# Patient Record
Sex: MALE | URBAN - METROPOLITAN AREA
[De-identification: names, ages, dates, MRNs, and addresses within clinical notes are randomized per-mention and may not be internally consistent; named-entity substitution may affect disease eponyms.]

---

## 2023-10-26 ENCOUNTER — NURSING HOME VISIT (OUTPATIENT)
Dept: POST ACUTE CARE | Facility: EXTERNAL LOCATION | Age: 88
End: 2023-10-26
Payer: MEDICARE

## 2023-10-26 DIAGNOSIS — E78.49 OTHER HYPERLIPIDEMIA: ICD-10-CM

## 2023-10-26 DIAGNOSIS — H91.93 BILATERAL DEAFNESS: ICD-10-CM

## 2023-10-26 DIAGNOSIS — F02.C0 SEVERE LATE ONSET ALZHEIMER'S DEMENTIA WITHOUT BEHAVIORAL DISTURBANCE, PSYCHOTIC DISTURBANCE, MOOD DISTURBANCE, OR ANXIETY (MULTI): ICD-10-CM

## 2023-10-26 DIAGNOSIS — R54 FRAILTY SYNDROME IN GERIATRIC PATIENT: Primary | ICD-10-CM

## 2023-10-26 DIAGNOSIS — H54.8 LEGAL BLINDNESS: ICD-10-CM

## 2023-10-26 DIAGNOSIS — G30.1 SEVERE LATE ONSET ALZHEIMER'S DEMENTIA WITHOUT BEHAVIORAL DISTURBANCE, PSYCHOTIC DISTURBANCE, MOOD DISTURBANCE, OR ANXIETY (MULTI): ICD-10-CM

## 2023-10-26 DIAGNOSIS — Z51.5 HOSPICE CARE PATIENT: ICD-10-CM

## 2023-10-26 PROCEDURE — 99306 1ST NF CARE HIGH MDM 50: CPT | Performed by: INTERNAL MEDICINE

## 2023-10-28 VITALS
TEMPERATURE: 97.9 F | OXYGEN SATURATION: 95 % | DIASTOLIC BLOOD PRESSURE: 72 MMHG | RESPIRATION RATE: 16 BRPM | HEART RATE: 88 BPM | SYSTOLIC BLOOD PRESSURE: 110 MMHG

## 2023-10-28 NOTE — PROGRESS NOTES
Subjective   Patient ID: Denny Aguayo is a 89 y.o. male who is long term resident being seen and evaluated for multiple medical problems.    HPI   This 89-year-old male patient is resting comfortably in his bed in no distress.  He has a history of blindness, deafness, Alzheimer's dementia, and history of distant alcohol use.  He has a patient on [facility].  He was previously residing at [facility].  He is admitted here to the extended care facility on hospice due to his declining state at the assisted living facility.  He is a [employer] .    Current medications:  Tylenol  DuoNeb  Levsin  Ativan  Morphine  Compazine    There are no current medications for review either on the chart or in epic    Review of Systems   Reason unable to perform ROS: Unable to communicate due to deafness and blindness.       Objective   /72   Pulse 88   Temp 36.6 °C (97.9 °F)   Resp 16   SpO2 95%     Physical Exam  Vitals reviewed.   Constitutional:       General: He is not in acute distress.     Appearance: He is not ill-appearing.      Comments: Frail elderly resting comfortably in bed in no distress   Cardiovascular:      Rate and Rhythm: Normal rate and regular rhythm.      Pulses: Normal pulses.      Heart sounds:      No gallop.   Pulmonary:      Breath sounds: Normal breath sounds. No wheezing, rhonchi or rales.   Abdominal:      General: Abdomen is flat. Bowel sounds are normal.      Palpations: Abdomen is soft.      Tenderness: There is no guarding or rebound.   Musculoskeletal:      Right lower leg: No edema.      Left lower leg: No edema.         Assessment/Plan   Problem List Items Addressed This Visit             ICD-10-CM    Frailty syndrome in geriatric patient - Primary R54    Severe late onset Alzheimer's dementia without behavioral disturbance, psychotic disturbance, mood disturbance, or anxiety (CMS/[facility]) G30.1, F02.C0    Legal blindness H54.8    Bilateral deafness H91.93     Other hyperlipidemia E78.49    Hospice care patient Z51.5     A.  We will continue with hospice and comfort care as the patient and family requested    B.  Laboratory examinations will be deferred due to hospice care status    C.  The patient's prognosis is [id number].

## 2023-10-30 DIAGNOSIS — F41.9 ANXIETY: Primary | ICD-10-CM

## 2023-10-30 RX ORDER — LORAZEPAM 0.5 MG/1
0.5 TABLET ORAL DAILY
Qty: 30 TABLET | Refills: 5 | Status: SHIPPED | OUTPATIENT
Start: 2023-10-30 | End: 2024-03-18 | Stop reason: SDUPTHER

## 2023-10-31 ENCOUNTER — NURSING HOME VISIT (OUTPATIENT)
Dept: POST ACUTE CARE | Facility: EXTERNAL LOCATION | Age: 88
End: 2023-10-31
Payer: MEDICARE

## 2023-10-31 VITALS
WEIGHT: 129 LBS | OXYGEN SATURATION: 99 % | TEMPERATURE: 97.4 F | RESPIRATION RATE: 16 BRPM | DIASTOLIC BLOOD PRESSURE: 78 MMHG | SYSTOLIC BLOOD PRESSURE: 141 MMHG | HEART RATE: 75 BPM

## 2023-10-31 DIAGNOSIS — H91.93 BILATERAL DEAFNESS: ICD-10-CM

## 2023-10-31 DIAGNOSIS — G30.1 SEVERE LATE ONSET ALZHEIMER'S DEMENTIA WITHOUT BEHAVIORAL DISTURBANCE, PSYCHOTIC DISTURBANCE, MOOD DISTURBANCE, OR ANXIETY (MULTI): ICD-10-CM

## 2023-10-31 DIAGNOSIS — F02.C0 SEVERE LATE ONSET ALZHEIMER'S DEMENTIA WITHOUT BEHAVIORAL DISTURBANCE, PSYCHOTIC DISTURBANCE, MOOD DISTURBANCE, OR ANXIETY (MULTI): ICD-10-CM

## 2023-10-31 DIAGNOSIS — R54 FRAILTY SYNDROME IN GERIATRIC PATIENT: ICD-10-CM

## 2023-10-31 DIAGNOSIS — Z51.5 HOSPICE CARE PATIENT: Primary | ICD-10-CM

## 2023-10-31 DIAGNOSIS — H54.8 LEGAL BLINDNESS: ICD-10-CM

## 2023-10-31 PROBLEM — E78.49 OTHER HYPERLIPIDEMIA: Status: ACTIVE | Noted: 2023-10-31

## 2023-10-31 PROCEDURE — 99309 SBSQ NF CARE MODERATE MDM 30: CPT | Performed by: NURSE PRACTITIONER

## 2023-10-31 NOTE — LETTER
Patient: Denny Aguayo  : 1930    Encounter Date: 10/31/2023    Subjective  Patient ID: Denny Aguayo is a 89 yo male here to follow up on recent admission to the facility.     HPI  He is under hospice care and the AL was unable to continue to meet his needs.  He is deaf and blind and does not communicate, he is frequently resistant to care.  When he is sitting in chair he tends to attempt to get out placing him at an increased risk of falls.  He is eating and drinking well per staff with full assist.  No concerns per staff.       Review of Systems   Reason unable to perform ROS: Unable to communicate due to deafness and blindness.       Objective  /78   Pulse 75   Temp 36.3 °C (97.4 °F)   Resp 16   Wt 58.5 kg (129 lb)   SpO2 99%     Physical Exam  Vitals reviewed.   Constitutional:       General: He is not in acute distress.     Appearance: He is not ill-appearing.      Comments: Frail elderly resting comfortably in bed in no distress   Cardiovascular:      Rate and Rhythm: Normal rate and regular rhythm.      Pulses: Normal pulses.      Heart sounds:      No gallop.   Pulmonary:      Breath sounds: Normal breath sounds. No wheezing, rhonchi or rales.   Abdominal:      General: Abdomen is flat. Bowel sounds are normal.      Palpations: Abdomen is soft.      Tenderness: There is no guarding or rebound.   Musculoskeletal:      Right lower leg: No edema.      Left lower leg: No edema.         Assessment/Plan  Problem List Items Addressed This Visit             ICD-10-CM    Frailty syndrome in geriatric patient R54     At times becomes agitated with care.          Severe late onset Alzheimer's dementia without behavioral disturbance, psychotic disturbance, mood disturbance, or anxiety (CMS/[facility]) G30.1, F02.C0     Under hospice care.           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          Bilateral deafness H91.93     He requires assistance with all adl's.          Hospice care  patient - Primary Z51.5     He is under hospice care.  Symptoms are well controlled.         meds/orders reviewed  staff to monitor and notify for any changes  Continue with hospice care  No further labs in keeping with hospice philosophy      Electronically Signed By: SIDRA Randhawa   10/31/23 12:18 PM

## 2023-10-31 NOTE — PROGRESS NOTES
Subjective   Patient ID: Denny Aguayo is a 89 yo male here to follow up on recent admission to the facility.     HPI  He is under hospice care and the AL was unable to continue to meet his needs.  He is deaf and blind and does not communicate, he is frequently resistant to care.  When he is sitting in chair he tends to attempt to get out placing him at an increased risk of falls.  He is eating and drinking well per staff with full assist.  No concerns per staff.       Review of Systems   Reason unable to perform ROS: Unable to communicate due to deafness and blindness.       Objective   /78   Pulse 75   Temp 36.3 °C (97.4 °F)   Resp 16   Wt 58.5 kg (129 lb)   SpO2 99%     Physical Exam  Vitals reviewed.   Constitutional:       General: He is not in acute distress.     Appearance: He is not ill-appearing.      Comments: Frail elderly resting comfortably in bed in no distress   Cardiovascular:      Rate and Rhythm: Normal rate and regular rhythm.      Pulses: Normal pulses.      Heart sounds:      No gallop.   Pulmonary:      Breath sounds: Normal breath sounds. No wheezing, rhonchi or rales.   Abdominal:      General: Abdomen is flat. Bowel sounds are normal.      Palpations: Abdomen is soft.      Tenderness: There is no guarding or rebound.   Musculoskeletal:      Right lower leg: No edema.      Left lower leg: No edema.         Assessment/Plan   Problem List Items Addressed This Visit             ICD-10-CM    Frailty syndrome in geriatric patient R54     At times becomes agitated with care.          Severe late onset Alzheimer's dementia without behavioral disturbance, psychotic disturbance, mood disturbance, or anxiety (CMS/[facility]) G30.1, F02.C0     Under hospice care.           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          Bilateral deafness H91.93     He requires assistance with all adl's.          Hospice care patient - Primary Z51.5     He is under hospice care.  Symptoms are  well controlled.         meds/orders reviewed  staff to monitor and notify for any changes  Continue with hospice care  No further labs in keeping with hospice philosophy

## 2023-11-17 ENCOUNTER — NURSING HOME VISIT (OUTPATIENT)
Dept: POST ACUTE CARE | Facility: EXTERNAL LOCATION | Age: 88
End: 2023-11-17
Payer: MEDICARE

## 2023-11-17 VITALS
HEART RATE: 72 BPM | SYSTOLIC BLOOD PRESSURE: 132 MMHG | WEIGHT: 122 LBS | DIASTOLIC BLOOD PRESSURE: 78 MMHG | RESPIRATION RATE: 16 BRPM | TEMPERATURE: 98 F | OXYGEN SATURATION: 97 %

## 2023-11-17 DIAGNOSIS — G30.1 SEVERE LATE ONSET ALZHEIMER'S DEMENTIA WITHOUT BEHAVIORAL DISTURBANCE, PSYCHOTIC DISTURBANCE, MOOD DISTURBANCE, OR ANXIETY (MULTI): ICD-10-CM

## 2023-11-17 DIAGNOSIS — F02.C0 SEVERE LATE ONSET ALZHEIMER'S DEMENTIA WITHOUT BEHAVIORAL DISTURBANCE, PSYCHOTIC DISTURBANCE, MOOD DISTURBANCE, OR ANXIETY (MULTI): ICD-10-CM

## 2023-11-17 DIAGNOSIS — H91.93 BILATERAL DEAFNESS: ICD-10-CM

## 2023-11-17 DIAGNOSIS — R54 FRAILTY SYNDROME IN GERIATRIC PATIENT: Primary | ICD-10-CM

## 2023-11-17 DIAGNOSIS — Z51.5 HOSPICE CARE PATIENT: ICD-10-CM

## 2023-11-17 DIAGNOSIS — H54.8 LEGAL BLINDNESS: ICD-10-CM

## 2023-11-17 PROCEDURE — 99308 SBSQ NF CARE LOW MDM 20: CPT | Performed by: INTERNAL MEDICINE

## 2023-11-17 NOTE — PROGRESS NOTES
Subjective   Patient ID: Denny Aguayo is a 89 y.o. male who is long term resident being seen and evaluated for multiple medical problems.    HPI   This 89-year-old male patient is resting comfortably in his chair in no distress.  He remains blind and deaf and quite debilitated resting in his Elo chair.  Nursing has no new adverse events reported to me.    Current high risk medication:  Levsin  Lorazepam  Morphine    Laboratory examinations have been deferred due to hospice care status    Review of Systems   Reason unable to perform ROS: Unable to communicate due to deafness and blindness.       Objective   /78   Pulse 72   Temp 36.7 °C (98 °F)   Resp 16   Wt 55.3 kg (122 lb)   SpO2 97%     Physical Exam  Vitals reviewed.   Constitutional:       General: He is not in acute distress.     Appearance: He is not ill-appearing.      Comments: Frail elderly resting comfortably in bed in no distress   Cardiovascular:      Rate and Rhythm: Normal rate and regular rhythm.      Pulses: Normal pulses.      Heart sounds:      No gallop.   Pulmonary:      Breath sounds: Normal breath sounds. No wheezing, rhonchi or rales.   Abdominal:      General: Abdomen is flat. Bowel sounds are normal.      Palpations: Abdomen is soft.      Tenderness: There is no guarding or rebound.   Musculoskeletal:      Right lower leg: No edema.      Left lower leg: No edema.         Assessment/Plan   Problem List Items Addressed This Visit             ICD-10-CM    Frailty syndrome in geriatric patient - Primary R54    Severe late onset Alzheimer's dementia without behavioral disturbance, psychotic disturbance, mood disturbance, or anxiety (CMS/[facility]) G30.1, F02.C0    Legal blindness H54.8    Bilateral deafness H91.93    Hospice care patient Z51.5       A.  We will continue with hospice and comfort care as the patient and family requested    B.  Laboratory examinations will be deferred due to hospice care status    C.  The patient's prognosis  is [id number].

## 2023-11-17 NOTE — LETTER
Patient: Denny Aguayo  : 1930    Encounter Date: 2023    Subjective  Patient ID: Denny Aguayo is a 89 y.o. male who is long term resident being seen and evaluated for multiple medical problems.    HPI   This 89-year-old male patient is resting comfortably in his chair in no distress.  He remains blind and deaf and quite debilitated resting in his Elo chair.  Nursing has no new adverse events reported to me.    Current high risk medication:  Levsin  Lorazepam  Morphine    Laboratory examinations have been deferred due to hospice care status    Review of Systems   Reason unable to perform ROS: Unable to communicate due to deafness and blindness.       Objective  /78   Pulse 72   Temp 36.7 °C (98 °F)   Resp 16   Wt 55.3 kg (122 lb)   SpO2 97%     Physical Exam  Vitals reviewed.   Constitutional:       General: He is not in acute distress.     Appearance: He is not ill-appearing.      Comments: Frail elderly resting comfortably in bed in no distress   Cardiovascular:      Rate and Rhythm: Normal rate and regular rhythm.      Pulses: Normal pulses.      Heart sounds:      No gallop.   Pulmonary:      Breath sounds: Normal breath sounds. No wheezing, rhonchi or rales.   Abdominal:      General: Abdomen is flat. Bowel sounds are normal.      Palpations: Abdomen is soft.      Tenderness: There is no guarding or rebound.   Musculoskeletal:      Right lower leg: No edema.      Left lower leg: No edema.         Assessment/Plan  Problem List Items Addressed This Visit             ICD-10-CM    Frailty syndrome in geriatric patient - Primary R54    Severe late onset Alzheimer's dementia without behavioral disturbance, psychotic disturbance, mood disturbance, or anxiety (CMS/[facility]) G30.1, F02.C0    Legal blindness H54.8    Bilateral deafness H91.93    Hospice care patient Z51.5       A.  We will continue with hospice and comfort care as the patient and family requested    B.  Laboratory examinations will  be deferred due to hospice care status    C.  The patient's prognosis is [id number].      Electronically Signed By: Bryant Peters MD   11/29/23  5:25 PM

## 2023-11-20 ENCOUNTER — TELEPHONE (OUTPATIENT)
Dept: PRIMARY CARE | Facility: CLINIC | Age: 88
End: 2023-11-20
Payer: MEDICARE

## 2023-11-20 NOTE — TELEPHONE ENCOUNTER
Mayela at [facility] advised that the family wants to switch patient from [facility] to them.  Asking for a verbal order from you.

## 2023-12-05 ENCOUNTER — NURSING HOME VISIT (OUTPATIENT)
Dept: POST ACUTE CARE | Facility: EXTERNAL LOCATION | Age: 88
End: 2023-12-05
Payer: MEDICARE

## 2023-12-05 VITALS
OXYGEN SATURATION: 97 % | RESPIRATION RATE: 16 BRPM | HEART RATE: 72 BPM | TEMPERATURE: 97.6 F | SYSTOLIC BLOOD PRESSURE: 132 MMHG | WEIGHT: 122 LBS | DIASTOLIC BLOOD PRESSURE: 78 MMHG

## 2023-12-05 DIAGNOSIS — G30.1 SEVERE LATE ONSET ALZHEIMER'S DEMENTIA WITHOUT BEHAVIORAL DISTURBANCE, PSYCHOTIC DISTURBANCE, MOOD DISTURBANCE, OR ANXIETY (MULTI): ICD-10-CM

## 2023-12-05 DIAGNOSIS — H54.8 LEGAL BLINDNESS: ICD-10-CM

## 2023-12-05 DIAGNOSIS — H91.93 BILATERAL DEAFNESS: ICD-10-CM

## 2023-12-05 DIAGNOSIS — F02.C0 SEVERE LATE ONSET ALZHEIMER'S DEMENTIA WITHOUT BEHAVIORAL DISTURBANCE, PSYCHOTIC DISTURBANCE, MOOD DISTURBANCE, OR ANXIETY (MULTI): ICD-10-CM
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
## 2023-12-05 DIAGNOSIS — Z51.5 HOSPICE CARE PATIENT: Primary | ICD-10-CM
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
## 2023-12-05 PROCEDURE — 99309 SBSQ NF CARE MODERATE MDM 30: CPT | Performed by: NURSE PRACTITIONER

## 2023-12-05 NOTE — ASSESSMENT & PLAN NOTE
He slid out of broda chair and no apparent injury.  He is legally blind and deaf coupled with cognitive deficits which increases his risk for falls.

## 2023-12-05 NOTE — LETTER
Patient: Denny Aguayo  : 1930    Encounter Date: 2023    Subjective  Patient ID: Denny Aguayo is a 89 yo male here to follow up on fall   HPI  He slid out of the bottom of the broda chair, no apparent injury.   He is legally  deaf and blind and does not communicate, he is frequently resistant to care.  When he is sitting in chair he tends to attempt to get out placing him at an increased risk of falls.  He is eating and drinking well per staff with full assist.  No concerns per staff.   They are ordering a different type of maría-chair which with the goal to decrease risk of fall.       Review of Systems   Reason unable to perform ROS: Unable to communicate due to deafness and blindness.       Objective  /78   Pulse 72   Temp 36.4 °C (97.6 °F)   Resp 16   Wt 55.3 kg (122 lb)   SpO2 97%     Physical Exam  Vitals reviewed.   Constitutional:       General: He is not in acute distress.     Appearance: He is not ill-appearing.      Comments: Thin, Frail elderly sitting up in chair  in no apparent distress   Cardiovascular:      Rate and Rhythm: Normal rate and regular rhythm.      Pulses: Normal pulses.      Heart sounds:      No gallop.   Pulmonary:      Breath sounds: Normal breath sounds. No wheezing, rhonchi or rales.   Abdominal:      General: Abdomen is flat. Bowel sounds are normal.      Palpations: Abdomen is soft.      Tenderness: There is no guarding or rebound.   Musculoskeletal:      Right lower leg: No edema.      Left lower leg: No edema.         Assessment/Plan  Problem List Items Addressed This Visit             ICD-10-CM    Severe late onset Alzheimer's dementia without behavioral disturbance, psychotic disturbance, mood disturbance, or anxiety (CMS/[facility]) G30.1, F02.C0     Under hospice care.           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          Bilateral deafness H91.93     He requires assistance with all adl's.          Hospice care patient -  Primary Z51.5     He is under hospice care.  Symptoms appear well controlled.          Fall W19.XXXA     He slid out of broda chair and no apparent injury.  He is legally blind and deaf coupled with cognitive deficits which increases his risk for falls.         meds/orders reviewed  staff to monitor and notify for any changes  Continue with hospice care  No further labs in keeping with hospice philosophy      Electronically Signed By: ZAINA Randhawa-CNP   12/5/23 11:09 AM

## 2023-12-05 NOTE — PROGRESS NOTES
Subjective   Patient ID: Denny Aguayo is a 89 yo male here to follow up on fall 11/30  HPI  He slid out of the bottom of the broda chair, no apparent injury.   He is legally  deaf and blind and does not communicate, he is frequently resistant to care.  When he is sitting in chair he tends to attempt to get out placing him at an increased risk of falls.  He is eating and drinking well per staff with full assist.  No concerns per staff.   They are ordering a different type of maría-chair which with the goal to decrease risk of fall.       Review of Systems   Reason unable to perform ROS: Unable to communicate due to deafness and blindness.       Objective   /78   Pulse 72   Temp 36.4 °C (97.6 °F)   Resp 16   Wt 55.3 kg (122 lb)   SpO2 97%     Physical Exam  Vitals reviewed.   Constitutional:       General: He is not in acute distress.     Appearance: He is not ill-appearing.      Comments: Thin, Frail elderly sitting up in chair  in no apparent distress   Cardiovascular:      Rate and Rhythm: Normal rate and regular rhythm.      Pulses: Normal pulses.      Heart sounds:      No gallop.   Pulmonary:      Breath sounds: Normal breath sounds. No wheezing, rhonchi or rales.   Abdominal:      General: Abdomen is flat. Bowel sounds are normal.      Palpations: Abdomen is soft.      Tenderness: There is no guarding or rebound.   Musculoskeletal:      Right lower leg: No edema.      Left lower leg: No edema.         Assessment/Plan   Problem List Items Addressed This Visit             ICD-10-CM    Severe late onset Alzheimer's dementia without behavioral disturbance, psychotic disturbance, mood disturbance, or anxiety (CMS/[facility]) G30.1, F02.C0     Under hospice care.           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          Bilateral deafness H91.93     He requires assistance with all adl's.          Hospice care patient - Primary Z51.5     He is under hospice care.  Symptoms appear well  controlled.          Fall W19.XXXA     He slid out of broda chair and no apparent injury.  He is legally blind and deaf coupled with cognitive deficits which increases his risk for falls.         meds/orders reviewed  staff to monitor and notify for any changes  Continue with hospice care  No further labs in keeping with hospice philosophy

## 2023-12-12 ENCOUNTER — NURSING HOME VISIT (OUTPATIENT)
Dept: POST ACUTE CARE | Facility: EXTERNAL LOCATION | Age: 88
End: 2023-12-12
Payer: MEDICARE

## 2023-12-12 VITALS
WEIGHT: 122 LBS | RESPIRATION RATE: 16 BRPM | OXYGEN SATURATION: 97 % | SYSTOLIC BLOOD PRESSURE: 132 MMHG | TEMPERATURE: 97.8 F | HEART RATE: 72 BPM | DIASTOLIC BLOOD PRESSURE: 78 MMHG

## 2023-12-12 DIAGNOSIS — H54.8 LEGAL BLINDNESS: ICD-10-CM

## 2023-12-12 DIAGNOSIS — Z51.5 HOSPICE CARE PATIENT: Primary | ICD-10-CM

## 2023-12-12 DIAGNOSIS — F02.C0 SEVERE LATE ONSET ALZHEIMER'S DEMENTIA WITHOUT BEHAVIORAL DISTURBANCE, PSYCHOTIC DISTURBANCE, MOOD DISTURBANCE, OR ANXIETY (MULTI): ICD-10-CM

## 2023-12-12 DIAGNOSIS — G30.1 SEVERE LATE ONSET ALZHEIMER'S DEMENTIA WITHOUT BEHAVIORAL DISTURBANCE, PSYCHOTIC DISTURBANCE, MOOD DISTURBANCE, OR ANXIETY (MULTI): ICD-10-CM

## 2023-12-12 DIAGNOSIS — H91.93 BILATERAL DEAFNESS: ICD-10-CM

## 2023-12-12 PROCEDURE — 99309 SBSQ NF CARE MODERATE MDM 30: CPT | Performed by: NURSE PRACTITIONER

## 2023-12-12 NOTE — PROGRESS NOTES
Subjective   Patient ID: Denny Aguayo is a 89 yo male here for routine monthly visit.   HPI    He is legally  deaf and blind and does not communicate, he is frequently resistant to care.    He is eating and drinking well per staff with full assist.  No concerns per staff.   He remains under hsopice care.       Review of Systems   Reason unable to perform ROS: Unable to communicate due to deafness and blindness.       Objective   /78   Pulse 72   Temp 36.6 °C (97.8 °F)   Resp 16   Wt 55.3 kg (122 lb)   SpO2 97%     Physical Exam  Vitals reviewed.   Constitutional:       General: He is not in acute distress.     Appearance: He is not ill-appearing.      Comments: Thin, Frail elderly sitting up in chair  in no apparent distress   Cardiovascular:      Rate and Rhythm: Normal rate and regular rhythm.      Pulses: Normal pulses.      Heart sounds:      No gallop.   Pulmonary:      Breath sounds: Normal breath sounds. No wheezing, rhonchi or rales.   Abdominal:      General: Abdomen is flat. Bowel sounds are normal.      Palpations: Abdomen is soft.      Tenderness: There is no guarding or rebound.   Musculoskeletal:      Right lower leg: No edema.      Left lower leg: No edema.         Assessment/Plan   Problem List Items Addressed This Visit             ICD-10-CM    Severe late onset Alzheimer's dementia without behavioral disturbance, psychotic disturbance, mood disturbance, or anxiety (CMS/[facility]) G30.1, F02.C0     Under hospice care.           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          Bilateral deafness H91.93     He requires assistance with all adl's.          Hospice care patient - Primary Z51.5     He is under hospice care.  Symptoms appear well controlled.         meds/orders reviewed  staff to monitor and notify for any changes  Continue with hospice care  No further labs in keeping with hospice philosophy

## 2023-12-12 NOTE — LETTER
Patient: Denny Aguayo  : 1930    Encounter Date: 2023    Subjective  Patient ID: Denny Aguayo is a 89 yo male here for routine monthly visit.   HPI    He is legally  deaf and blind and does not communicate, he is frequently resistant to care.    He is eating and drinking well per staff with full assist.  No concerns per staff.   He remains under hsopice care.       Review of Systems   Reason unable to perform ROS: Unable to communicate due to deafness and blindness.       Objective  /78   Pulse 72   Temp 36.6 °C (97.8 °F)   Resp 16   Wt 55.3 kg (122 lb)   SpO2 97%     Physical Exam  Vitals reviewed.   Constitutional:       General: He is not in acute distress.     Appearance: He is not ill-appearing.      Comments: Thin, Frail elderly sitting up in chair  in no apparent distress   Cardiovascular:      Rate and Rhythm: Normal rate and regular rhythm.      Pulses: Normal pulses.      Heart sounds:      No gallop.   Pulmonary:      Breath sounds: Normal breath sounds. No wheezing, rhonchi or rales.   Abdominal:      General: Abdomen is flat. Bowel sounds are normal.      Palpations: Abdomen is soft.      Tenderness: There is no guarding or rebound.   Musculoskeletal:      Right lower leg: No edema.      Left lower leg: No edema.         Assessment/Plan  Problem List Items Addressed This Visit             ICD-10-CM    Severe late onset Alzheimer's dementia without behavioral disturbance, psychotic disturbance, mood disturbance, or anxiety (CMS/[facility]) G30.1, F02.C0     Under hospice care.           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          Bilateral deafness H91.93     He requires assistance with all adl's.          Hospice care patient - Primary Z51.5     He is under hospice care.  Symptoms appear well controlled.         meds/orders reviewed  staff to monitor and notify for any changes  Continue with hospice care  No further labs in keeping with hospice  philosophy      Electronically Signed By: ZAINA Randhawa-CNP   12/12/23 11:46 AM

## 2023-12-15 ENCOUNTER — NURSING HOME VISIT (OUTPATIENT)
Dept: POST ACUTE CARE | Facility: EXTERNAL LOCATION | Age: 88
End: 2023-12-15
Payer: MEDICARE

## 2023-12-15 VITALS
SYSTOLIC BLOOD PRESSURE: 132 MMHG | HEART RATE: 72 BPM | RESPIRATION RATE: 16 BRPM | OXYGEN SATURATION: 97 % | DIASTOLIC BLOOD PRESSURE: 78 MMHG | TEMPERATURE: 97.9 F | WEIGHT: 122 LBS

## 2023-12-15 DIAGNOSIS — F02.C0 SEVERE LATE ONSET ALZHEIMER'S DEMENTIA WITHOUT BEHAVIORAL DISTURBANCE, PSYCHOTIC DISTURBANCE, MOOD DISTURBANCE, OR ANXIETY (MULTI): ICD-10-CM

## 2023-12-15 DIAGNOSIS — Z51.5 HOSPICE CARE PATIENT: ICD-10-CM
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
## 2023-12-15 DIAGNOSIS — R54 FRAILTY SYNDROME IN GERIATRIC PATIENT: Primary | ICD-10-CM

## 2023-12-15 DIAGNOSIS — H91.93 BILATERAL DEAFNESS: ICD-10-CM

## 2023-12-15 DIAGNOSIS — H54.8 LEGAL BLINDNESS: ICD-10-CM

## 2023-12-15 DIAGNOSIS — G30.1 SEVERE LATE ONSET ALZHEIMER'S DEMENTIA WITHOUT BEHAVIORAL DISTURBANCE, PSYCHOTIC DISTURBANCE, MOOD DISTURBANCE, OR ANXIETY (MULTI): ICD-10-CM

## 2023-12-15 PROCEDURE — 99308 SBSQ NF CARE LOW MDM 20: CPT | Performed by: INTERNAL MEDICINE

## 2023-12-15 NOTE — PROGRESS NOTES
Subjective   Patient ID: Denny Aguayo is a 89 y.o. male who is long term resident being seen and evaluated for multiple medical problems.    HPI   This 89-year-old male patient is resting comfortably in his recliner chair in no distress today.  He is blind and deaf and cannot communicate effectively.  Nursing has no new adverse events reported to me.    The patient's medication list has been reviewed in detail by me.    There are no laboratory examinations on the chart due to hospice care services.    Review of Systems   Reason unable to perform ROS: Unable to communicate due to deafness and blindness.       Objective   /78   Pulse 72   Temp 36.6 °C (97.9 °F)   Resp 16   Wt 55.3 kg (122 lb)   SpO2 97%     Physical Exam  Vitals reviewed.   Constitutional:       General: He is not in acute distress.     Appearance: He is not ill-appearing.      Comments: Thin, Frail elderly sitting up in chair  in no apparent distress   Cardiovascular:      Rate and Rhythm: Normal rate and regular rhythm.      Pulses: Normal pulses.      Heart sounds:      No gallop.   Pulmonary:      Breath sounds: Normal breath sounds. No wheezing, rhonchi or rales.   Abdominal:      General: Abdomen is flat. Bowel sounds are normal.      Palpations: Abdomen is soft.      Tenderness: There is no guarding or rebound.   Musculoskeletal:      Right lower leg: No edema.      Left lower leg: No edema.         Assessment/Plan   Problem List Items Addressed This Visit             ICD-10-CM    Frailty syndrome in geriatric patient - Primary R54    Severe late onset Alzheimer's dementia without behavioral disturbance, psychotic disturbance, mood disturbance, or anxiety (CMS/[facility]) G30.1, F02.C0    Legal blindness H54.8    Bilateral deafness H91.93    Hospice care patient Z51.5    Fall W19.XXXA       8.  We will continue with hospice and comfort care as the patient and family request    B.  Laboratory examinations will continue to be deferred due to  hospice care status    C.  The patient's prognosis is [id number].

## 2023-12-15 NOTE — LETTER
Patient: Denny Aguayo  : 1930    Encounter Date: 12/15/2023    Subjective  Patient ID: Denny Aguayo is a 89 y.o. male who is long term resident being seen and evaluated for multiple medical problems.    HPI   This 89-year-old male patient is resting comfortably in his recliner chair in no distress today.  He is blind and deaf and cannot communicate effectively.  Nursing has no new adverse events reported to me.    The patient's medication list has been reviewed in detail by me.    There are no laboratory examinations on the chart due to hospice care services.    Review of Systems   Reason unable to perform ROS: Unable to communicate due to deafness and blindness.       Objective  /78   Pulse 72   Temp 36.6 °C (97.9 °F)   Resp 16   Wt 55.3 kg (122 lb)   SpO2 97%     Physical Exam  Vitals reviewed.   Constitutional:       General: He is not in acute distress.     Appearance: He is not ill-appearing.      Comments: Thin, Frail elderly sitting up in chair  in no apparent distress   Cardiovascular:      Rate and Rhythm: Normal rate and regular rhythm.      Pulses: Normal pulses.      Heart sounds:      No gallop.   Pulmonary:      Breath sounds: Normal breath sounds. No wheezing, rhonchi or rales.   Abdominal:      General: Abdomen is flat. Bowel sounds are normal.      Palpations: Abdomen is soft.      Tenderness: There is no guarding or rebound.   Musculoskeletal:      Right lower leg: No edema.      Left lower leg: No edema.         Assessment/Plan  Problem List Items Addressed This Visit             ICD-10-CM    Frailty syndrome in geriatric patient - Primary R54    Severe late onset Alzheimer's dementia without behavioral disturbance, psychotic disturbance, mood disturbance, or anxiety (CMS/[facility]) G30.1, F02.C0    Legal blindness H54.8    Bilateral deafness H91.93    Hospice care patient Z51.5    Fall W19.XXXA       8.  We will continue with hospice and comfort care as the patient and family  request    B.  Laboratory examinations will continue to be deferred due to hospice care status    C.  The patient's prognosis is [id number].      Electronically Signed By: Bryant Peters MD   12/27/23  5:47 PM

## 2024-01-18 ENCOUNTER — NURSING HOME VISIT (OUTPATIENT)
Dept: POST ACUTE CARE | Facility: EXTERNAL LOCATION | Age: 89
End: 2024-01-18
Payer: MEDICARE

## 2024-01-18 VITALS
SYSTOLIC BLOOD PRESSURE: 132 MMHG | OXYGEN SATURATION: 97 % | DIASTOLIC BLOOD PRESSURE: 78 MMHG | RESPIRATION RATE: 16 BRPM | HEART RATE: 72 BPM | TEMPERATURE: 97.8 F | WEIGHT: 119 LBS

## 2024-01-18 DIAGNOSIS — R54 FRAILTY SYNDROME IN GERIATRIC PATIENT: Primary | ICD-10-CM

## 2024-01-18 DIAGNOSIS — Z51.5 HOSPICE CARE PATIENT: ICD-10-CM

## 2024-01-18 DIAGNOSIS — G30.1 SEVERE LATE ONSET ALZHEIMER'S DEMENTIA WITHOUT BEHAVIORAL DISTURBANCE, PSYCHOTIC DISTURBANCE, MOOD DISTURBANCE, OR ANXIETY (MULTI): ICD-10-CM

## 2024-01-18 DIAGNOSIS — H91.93 BILATERAL DEAFNESS: ICD-10-CM

## 2024-01-18 DIAGNOSIS — H54.8 LEGAL BLINDNESS: ICD-10-CM

## 2024-01-18 DIAGNOSIS — F02.C0 SEVERE LATE ONSET ALZHEIMER'S DEMENTIA WITHOUT BEHAVIORAL DISTURBANCE, PSYCHOTIC DISTURBANCE, MOOD DISTURBANCE, OR ANXIETY (MULTI): ICD-10-CM

## 2024-01-18 PROCEDURE — 99308 SBSQ NF CARE LOW MDM 20: CPT | Performed by: INTERNAL MEDICINE

## 2024-01-18 NOTE — PROGRESS NOTES
Subjective   Patient ID: Denny Aguayo is a 89 y.o. male who is long term resident being seen and evaluated for multiple medical problems.    HPI   This 89-year-old male patient is resting comfortably in his bed in no distress whatsoever.  This gentleman is blind and deaf contracted laying in bed.  Nursing has no new adverse events reported to me at this time.    Current high risk medication:  Levsin  Lorazepam  Morphine  Compazine  Senna    Laboratory examinations have been deferred due to hospice care status    Review of Systems   Reason unable to perform ROS: Unable to communicate due to deafness and blindness.       Objective   /78   Pulse 72   Temp 36.6 °C (97.8 °F)   Resp 16   Wt 54 kg (119 lb)   SpO2 97%     Physical Exam  Vitals reviewed.   Constitutional:       General: He is not in acute distress.     Appearance: He is not ill-appearing.      Comments: Thin, Frail elderly sitting up in chair  in no apparent distress   Cardiovascular:      Rate and Rhythm: Normal rate and regular rhythm.      Pulses: Normal pulses.      Heart sounds:      No gallop.   Pulmonary:      Breath sounds: Normal breath sounds. No wheezing, rhonchi or rales.   Abdominal:      General: Abdomen is flat. Bowel sounds are normal.      Palpations: Abdomen is soft.      Tenderness: There is no guarding or rebound.   Musculoskeletal:      Right lower leg: No edema.      Left lower leg: No edema.         Assessment/Plan   Problem List Items Addressed This Visit             ICD-10-CM    Frailty syndrome in geriatric patient - Primary R54    Severe late onset Alzheimer's dementia without behavioral disturbance, psychotic disturbance, mood disturbance, or anxiety (CMS/[facility]) G30.1, F02.C0    Legal blindness H54.8    Bilateral deafness H91.93    Hospice care patient Z51.5     8.  We will continue with hospice and comfort care as the patient and family requested    B.  Laboratory examinations will continue to be deferred due to hospice  care status    C.  The patient's prognosis is [id number].

## 2024-01-18 NOTE — LETTER
Patient: Denny Aguayo  : 1930    Encounter Date: 2024    Subjective  Patient ID: Denny Aguayo is a 89 y.o. male who is long term resident being seen and evaluated for multiple medical problems.    HPI   This 89-year-old male patient is resting comfortably in his bed in no distress whatsoever.  This gentleman is blind and deaf contracted laying in bed.  Nursing has no new adverse events reported to me at this time.    Current high risk medication:  Levsin  Lorazepam  Morphine  Compazine  Senna    Laboratory examinations have been deferred due to hospice care status    Review of Systems   Reason unable to perform ROS: Unable to communicate due to deafness and blindness.       Objective  /78   Pulse 72   Temp 36.6 °C (97.8 °F)   Resp 16   Wt 54 kg (119 lb)   SpO2 97%     Physical Exam  Vitals reviewed.   Constitutional:       General: He is not in acute distress.     Appearance: He is not ill-appearing.      Comments: Thin, Frail elderly sitting up in chair  in no apparent distress   Cardiovascular:      Rate and Rhythm: Normal rate and regular rhythm.      Pulses: Normal pulses.      Heart sounds:      No gallop.   Pulmonary:      Breath sounds: Normal breath sounds. No wheezing, rhonchi or rales.   Abdominal:      General: Abdomen is flat. Bowel sounds are normal.      Palpations: Abdomen is soft.      Tenderness: There is no guarding or rebound.   Musculoskeletal:      Right lower leg: No edema.      Left lower leg: No edema.         Assessment/Plan  Problem List Items Addressed This Visit             ICD-10-CM    Frailty syndrome in geriatric patient - Primary R54    Severe late onset Alzheimer's dementia without behavioral disturbance, psychotic disturbance, mood disturbance, or anxiety (CMS/[facility]) G30.1, F02.C0    Legal blindness H54.8    Bilateral deafness H91.93    Hospice care patient Z51.5     8.  We will continue with hospice and comfort care as the patient and family requested    KENYA   Laboratory examinations will continue to be deferred due to hospice care status    C.  The patient's prognosis is [id number].        Electronically Signed By: Bryant Peters MD   1/24/24  4:58 PM

## 2024-02-23 ENCOUNTER — NURSING HOME VISIT (OUTPATIENT)
Dept: POST ACUTE CARE | Facility: EXTERNAL LOCATION | Age: 89
End: 2024-02-23
Payer: MEDICARE

## 2024-02-23 VITALS
HEART RATE: 113 BPM | RESPIRATION RATE: 16 BRPM | OXYGEN SATURATION: 98 % | DIASTOLIC BLOOD PRESSURE: 76 MMHG | WEIGHT: 116 LBS | SYSTOLIC BLOOD PRESSURE: 131 MMHG | TEMPERATURE: 97.8 F

## 2024-02-23 DIAGNOSIS — H91.93 BILATERAL DEAFNESS: ICD-10-CM

## 2024-02-23 DIAGNOSIS — Z51.5 HOSPICE CARE PATIENT: ICD-10-CM

## 2024-02-23 DIAGNOSIS — F02.C0 SEVERE LATE ONSET ALZHEIMER'S DEMENTIA WITHOUT BEHAVIORAL DISTURBANCE, PSYCHOTIC DISTURBANCE, MOOD DISTURBANCE, OR ANXIETY (MULTI): ICD-10-CM

## 2024-02-23 DIAGNOSIS — G30.1 SEVERE LATE ONSET ALZHEIMER'S DEMENTIA WITHOUT BEHAVIORAL DISTURBANCE, PSYCHOTIC DISTURBANCE, MOOD DISTURBANCE, OR ANXIETY (MULTI): ICD-10-CM

## 2024-02-23 DIAGNOSIS — R54 FRAILTY SYNDROME IN GERIATRIC PATIENT: ICD-10-CM

## 2024-02-23 DIAGNOSIS — H54.8 LEGAL BLINDNESS: ICD-10-CM

## 2024-02-23 DIAGNOSIS — U07.1 COVID: Primary | ICD-10-CM

## 2024-02-23 PROCEDURE — 99308 SBSQ NF CARE LOW MDM 20: CPT | Performed by: INTERNAL MEDICINE

## 2024-02-23 NOTE — PROGRESS NOTES
Subjective   Patient ID: Denny Aguayo is a 89 y.o. male who is long term resident being seen and evaluated for multiple medical problems.    HPI   This 89-year-old male patient is resting comfortably in his bed in no distress.  He is the gentleman that is blind and deaf.  He did test positive for COVID-19 virus infection but appears to be stable for the most part.  Nursing has no new adverse events reported to me.    Current high risk medication:  Levsin  Ativan  Morphine  Promethazine  Senna    There are no laboratory examinations on the chart due to hospice care status      Review of Systems   Reason unable to perform ROS: Unable to communicate due to deafness and blindness.       Objective   /76   Pulse (!) 113   Temp 36.6 °C (97.8 °F)   Resp 16   Wt 52.6 kg (116 lb)   SpO2 98%     Physical Exam  Vitals reviewed.   Constitutional:       General: He is not in acute distress.     Appearance: He is not ill-appearing.      Comments: Thin, Frail elderly resting in bed in no acute distress   Cardiovascular:      Rate and Rhythm: Normal rate and regular rhythm.      Pulses: Normal pulses.      Heart sounds:      No gallop.   Pulmonary:      Breath sounds: Normal breath sounds. No wheezing, rhonchi or rales.   Abdominal:      General: Abdomen is flat. Bowel sounds are normal.      Palpations: Abdomen is soft.      Tenderness: There is no guarding or rebound.   Musculoskeletal:      Right lower leg: No edema.      Left lower leg: No edema.         Assessment/Plan   Problem List Items Addressed This Visit             ICD-10-CM    Frailty syndrome in geriatric patient R54    Severe late onset Alzheimer's dementia without behavioral disturbance, psychotic disturbance, mood disturbance, or anxiety (CMS/[facility]) G30.1, F02.C0    Legal blindness H54.8    Bilateral deafness H91.93    Hospice care patient Z51.5    COVID - Primary U07.1       A.  We will continue with hospice comfort care as the patient and family  request    B.  Laboratory examinations will continue to be deferred due to hospice care status    C.  The patient is felt to be unlikely to benefit significantly from Paxlovid therapy at this time for COVID-19 virus infection as he appears to be recovering from it with minimal symptomatology    D.  The patient's prognosis is [id number].

## 2024-02-23 NOTE — LETTER
Patient: Denny Aguayo  : 1930    Encounter Date: 2024    Subjective  Patient ID: Denny Aguayo is a 89 y.o. male who is long term resident being seen and evaluated for multiple medical problems.    HPI   This 89-year-old male patient is resting comfortably in his bed in no distress.  He is the gentleman that is blind and deaf.  He did test positive for COVID-19 virus infection but appears to be stable for the most part.  Nursing has no new adverse events reported to me.    Current high risk medication:  Levsin  Ativan  Morphine  Promethazine  Senna    There are no laboratory examinations on the chart due to hospice care status      Review of Systems   Reason unable to perform ROS: Unable to communicate due to deafness and blindness.       Objective  /76   Pulse (!) 113   Temp 36.6 °C (97.8 °F)   Resp 16   Wt 52.6 kg (116 lb)   SpO2 98%     Physical Exam  Vitals reviewed.   Constitutional:       General: He is not in acute distress.     Appearance: He is not ill-appearing.      Comments: Thin, Frail elderly resting in bed in no acute distress   Cardiovascular:      Rate and Rhythm: Normal rate and regular rhythm.      Pulses: Normal pulses.      Heart sounds:      No gallop.   Pulmonary:      Breath sounds: Normal breath sounds. No wheezing, rhonchi or rales.   Abdominal:      General: Abdomen is flat. Bowel sounds are normal.      Palpations: Abdomen is soft.      Tenderness: There is no guarding or rebound.   Musculoskeletal:      Right lower leg: No edema.      Left lower leg: No edema.         Assessment/Plan  Problem List Items Addressed This Visit             ICD-10-CM    Frailty syndrome in geriatric patient R54    Severe late onset Alzheimer's dementia without behavioral disturbance, psychotic disturbance, mood disturbance, or anxiety (CMS/[facility]) G30.1, F02.C0    Legal blindness H54.8    Bilateral deafness H91.93    Hospice care patient Z51.5    COVID - Primary U07.1       A.  We will  continue with hospice comfort care as the patient and family request    B.  Laboratory examinations will continue to be deferred due to hospice care status    C.  The patient is felt to be unlikely to benefit significantly from Paxlovid therapy at this time for COVID-19 virus infection as he appears to be recovering from it with minimal symptomatology    D.  The patient's prognosis is [id number].      Electronically Signed By: Bryant Peters MD   3/2/24 12:32 PM

## 2024-02-27 ENCOUNTER — NURSING HOME VISIT (OUTPATIENT)
Dept: POST ACUTE CARE | Facility: EXTERNAL LOCATION | Age: 89
End: 2024-02-27
Payer: MEDICARE

## 2024-02-27 VITALS
RESPIRATION RATE: 16 BRPM | DIASTOLIC BLOOD PRESSURE: 76 MMHG | SYSTOLIC BLOOD PRESSURE: 131 MMHG | TEMPERATURE: 98 F | WEIGHT: 116 LBS | HEART RATE: 64 BPM | OXYGEN SATURATION: 98 %

## 2024-02-27 DIAGNOSIS — H91.93 BILATERAL DEAFNESS: ICD-10-CM

## 2024-02-27 DIAGNOSIS — G30.1 SEVERE LATE ONSET ALZHEIMER'S DEMENTIA WITHOUT BEHAVIORAL DISTURBANCE, PSYCHOTIC DISTURBANCE, MOOD DISTURBANCE, OR ANXIETY (MULTI): ICD-10-CM

## 2024-02-27 DIAGNOSIS — H54.8 LEGAL BLINDNESS: ICD-10-CM

## 2024-02-27 DIAGNOSIS — U07.1 COVID: ICD-10-CM

## 2024-02-27 DIAGNOSIS — F02.C0 SEVERE LATE ONSET ALZHEIMER'S DEMENTIA WITHOUT BEHAVIORAL DISTURBANCE, PSYCHOTIC DISTURBANCE, MOOD DISTURBANCE, OR ANXIETY (MULTI): ICD-10-CM

## 2024-02-27 DIAGNOSIS — Z51.5 HOSPICE CARE PATIENT: Primary | ICD-10-CM

## 2024-02-27 PROCEDURE — 99309 SBSQ NF CARE MODERATE MDM 30: CPT | Performed by: NURSE PRACTITIONER

## 2024-02-27 NOTE — LETTER
Patient: Denny Aguayo  : 1930    Encounter Date: 2024    Subjective  Patient ID: Denny Aguayo is a 89 yo male here for routine monthly visit.   HPI    He is legally  deaf and blind and does not communicate, he is frequently resistant to care.    He is eating and drinking well per staff with full assist.  No concerns per staff.   He remains under hospice care.   HE tested postive for covid on routine outbreak testing, no sx at present.       Review of Systems   Reason unable to perform ROS: Unable to communicate due to deafness and blindness.       Objective  /76   Pulse 64   Temp 36.7 °C (98 °F)   Resp 16   Wt 52.6 kg (116 lb)   SpO2 98%     Physical Exam  Vitals reviewed.   Constitutional:       General: He is not in acute distress.     Appearance: He is not ill-appearing.      Comments: Thin, Frail elderly sitting up in chair  in no apparent distress   Cardiovascular:      Rate and Rhythm: Normal rate and regular rhythm.      Pulses: Normal pulses.      Heart sounds:      No gallop.   Pulmonary:      Breath sounds: Normal breath sounds. No wheezing, rhonchi or rales.   Abdominal:      General: Abdomen is flat. Bowel sounds are normal.      Palpations: Abdomen is soft.      Tenderness: There is no guarding or rebound.   Musculoskeletal:      Right lower leg: No edema.      Left lower leg: No edema.         Assessment/Plan  Problem List Items Addressed This Visit             ICD-10-CM    Severe late onset Alzheimer's dementia without behavioral disturbance, psychotic disturbance, mood disturbance, or anxiety (CMS/[facility]) G30.1, F02.C0     Under hospice care.  He will open eyes to tactile stimulation but not consistently.   Appears conmfortable at present         Legal blindness H54.8     Requires assistance with all adl's.          Bilateral deafness H91.93     He requires assistance with all adl's.          Hospice care patient - Primary Z51.5     He is under hospice care.  Symptoms appear  well controlled.          COVID U07.1     He tested positive on routine outbreak testing in the facility.  Asymptomatic at present.   Continue with isolation per facility protocol        meds/orders reviewed  staff to monitor and notify for any changes  Continue with hospice care  No further labs in keeping with hospice philosophy  Continue with covid isolation per facility protocol      Electronically Signed By: SIDRA Randhawa   2/27/24 11:24 AM

## 2024-02-27 NOTE — ASSESSMENT & PLAN NOTE
He tested positive on routine outbreak testing in the facility.  Asymptomatic at present.   Continue with isolation per facility protocol

## 2024-02-27 NOTE — PROGRESS NOTES
Subjective   Patient ID: Denny Aguayo is a 89 yo male here for routine monthly visit.   HPI    He is legally  deaf and blind and does not communicate, he is frequently resistant to care.    He is eating and drinking well per staff with full assist.  No concerns per staff.   He remains under hospice care.   HE tested postive for covid on routine outbreak testing, no sx at present.       Review of Systems   Reason unable to perform ROS: Unable to communicate due to deafness and blindness.       Objective   /76   Pulse 64   Temp 36.7 °C (98 °F)   Resp 16   Wt 52.6 kg (116 lb)   SpO2 98%     Physical Exam  Vitals reviewed.   Constitutional:       General: He is not in acute distress.     Appearance: He is not ill-appearing.      Comments: Thin, Frail elderly sitting up in chair  in no apparent distress   Cardiovascular:      Rate and Rhythm: Normal rate and regular rhythm.      Pulses: Normal pulses.      Heart sounds:      No gallop.   Pulmonary:      Breath sounds: Normal breath sounds. No wheezing, rhonchi or rales.   Abdominal:      General: Abdomen is flat. Bowel sounds are normal.      Palpations: Abdomen is soft.      Tenderness: There is no guarding or rebound.   Musculoskeletal:      Right lower leg: No edema.      Left lower leg: No edema.         Assessment/Plan   Problem List Items Addressed This Visit             ICD-10-CM    Severe late onset Alzheimer's dementia without behavioral disturbance, psychotic disturbance, mood disturbance, or anxiety (CMS/[facility]) G30.1, F02.C0     Under hospice care.  He will open eyes to tactile stimulation but not consistently.   Appears conmfortable at present         Legal blindness H54.8     Requires assistance with all adl's.          Bilateral deafness H91.93     He requires assistance with all adl's.          Hospice care patient - Primary Z51.5     He is under hospice care.  Symptoms appear well controlled.          COVID U07.1     He tested positive on  routine outbreak testing in the facility.  Asymptomatic at present.   Continue with isolation per facility protocol        meds/orders reviewed  staff to monitor and notify for any changes  Continue with hospice care  No further labs in keeping with hospice philosophy  Continue with covid isolation per facility protocol

## 2024-02-27 NOTE — ASSESSMENT & PLAN NOTE
Under hospice care.  He will open eyes to tactile stimulation but not consistently.   Appears conmfortable at present

## 2024-03-15 ENCOUNTER — NURSING HOME VISIT (OUTPATIENT)
Dept: POST ACUTE CARE | Facility: EXTERNAL LOCATION | Age: 89
End: 2024-03-15
Payer: MEDICARE

## 2024-03-15 VITALS
HEART RATE: 66 BPM | WEIGHT: 112 LBS | TEMPERATURE: 97.8 F | DIASTOLIC BLOOD PRESSURE: 80 MMHG | OXYGEN SATURATION: 97 % | RESPIRATION RATE: 16 BRPM | SYSTOLIC BLOOD PRESSURE: 142 MMHG

## 2024-03-15 DIAGNOSIS — F02.C0 SEVERE LATE ONSET ALZHEIMER'S DEMENTIA WITHOUT BEHAVIORAL DISTURBANCE, PSYCHOTIC DISTURBANCE, MOOD DISTURBANCE, OR ANXIETY (MULTI): ICD-10-CM

## 2024-03-15 DIAGNOSIS — H91.93 BILATERAL DEAFNESS: ICD-10-CM

## 2024-03-15 DIAGNOSIS — Z51.5 HOSPICE CARE PATIENT: ICD-10-CM

## 2024-03-15 DIAGNOSIS — R54 FRAILTY SYNDROME IN GERIATRIC PATIENT: ICD-10-CM

## 2024-03-15 DIAGNOSIS — H54.8 LEGAL BLINDNESS: Primary | ICD-10-CM

## 2024-03-15 DIAGNOSIS — G30.1 SEVERE LATE ONSET ALZHEIMER'S DEMENTIA WITHOUT BEHAVIORAL DISTURBANCE, PSYCHOTIC DISTURBANCE, MOOD DISTURBANCE, OR ANXIETY (MULTI): ICD-10-CM

## 2024-03-15 PROCEDURE — 99308 SBSQ NF CARE LOW MDM 20: CPT | Performed by: INTERNAL MEDICINE

## 2024-03-15 NOTE — LETTER
Patient: Denny Aguayo  : 1930    Encounter Date: 03/15/2024    Subjective  Patient ID: Denny Aguayo is a 89 y.o. male who is long term resident being seen and evaluated for multiple medical problems.    HPI   This 89-year-old male patient is resting comfortably in the Elo chair in no distress.  He appears comfortable.  He has a coarse nonverbal and minimally interactive due to being deaf and blind.  Nursing has no new adverse events reported to me at this time.  The patient was treated empirically for urinary tract infection by hospice services.    Current high risk medication:  Levsin  Ativan  Morphine  Compazine  Senna    There are no laboratory examinations on the chart at this time due to hospice care status      Review of Systems   Reason unable to perform ROS: Unable to communicate due to deafness and blindness.       Objective  /80   Pulse 66   Temp 36.6 °C (97.8 °F)   Resp 16   Wt 50.8 kg (112 lb)   SpO2 97%     Physical Exam  Vitals reviewed.   Constitutional:       General: He is not in acute distress.     Appearance: He is not ill-appearing.      Comments: Thin, Frail elderly sitting up in chair  in no apparent distress   Cardiovascular:      Rate and Rhythm: Normal rate and regular rhythm.      Pulses: Normal pulses.      Heart sounds:      No gallop.   Pulmonary:      Breath sounds: Normal breath sounds. No wheezing, rhonchi or rales.   Abdominal:      General: Abdomen is flat. Bowel sounds are normal.      Palpations: Abdomen is soft.      Tenderness: There is no guarding or rebound.   Musculoskeletal:      Right lower leg: No edema.      Left lower leg: No edema.         Assessment/Plan  Problem List Items Addressed This Visit             ICD-10-CM    Frailty syndrome in geriatric patient R54    Severe late onset Alzheimer's dementia without behavioral disturbance, psychotic disturbance, mood disturbance, or anxiety (CMS/[facility]) G30.1, F02.C0    Legal blindness - Primary H54.8     Bilateral deafness H91.93    Hospice care patient Z51.5     8.  We will continue with hospice and comfort care as the patient and family requested    B.  Laboratory examinations will continue to be deferred due to hospice care status    C.  The patient's prognosis is [id number].        Electronically Signed By: Bryant Peters MD   3/20/24  5:29 PM

## 2024-03-15 NOTE — PROGRESS NOTES
Subjective   Patient ID: Denny Aguayo is a 89 y.o. male who is long term resident being seen and evaluated for multiple medical problems.    HPI   This 89-year-old male patient is resting comfortably in the Elo chair in no distress.  He appears comfortable.  He has a coarse nonverbal and minimally interactive due to being deaf and blind.  Nursing has no new adverse events reported to me at this time.  The patient was treated empirically for urinary tract infection by hospice services.    Current high risk medication:  Levsin  Ativan  Morphine  Compazine  Senna    There are no laboratory examinations on the chart at this time due to hospice care status      Review of Systems   Reason unable to perform ROS: Unable to communicate due to deafness and blindness.       Objective   /80   Pulse 66   Temp 36.6 °C (97.8 °F)   Resp 16   Wt 50.8 kg (112 lb)   SpO2 97%     Physical Exam  Vitals reviewed.   Constitutional:       General: He is not in acute distress.     Appearance: He is not ill-appearing.      Comments: Thin, Frail elderly sitting up in chair  in no apparent distress   Cardiovascular:      Rate and Rhythm: Normal rate and regular rhythm.      Pulses: Normal pulses.      Heart sounds:      No gallop.   Pulmonary:      Breath sounds: Normal breath sounds. No wheezing, rhonchi or rales.   Abdominal:      General: Abdomen is flat. Bowel sounds are normal.      Palpations: Abdomen is soft.      Tenderness: There is no guarding or rebound.   Musculoskeletal:      Right lower leg: No edema.      Left lower leg: No edema.         Assessment/Plan   Problem List Items Addressed This Visit             ICD-10-CM    Frailty syndrome in geriatric patient R54    Severe late onset Alzheimer's dementia without behavioral disturbance, psychotic disturbance, mood disturbance, or anxiety (CMS/[facility]) G30.1, F02.C0    Legal blindness - Primary H54.8    Bilateral deafness H91.93    Hospice care patient Z51.5     8.  We will  continue with hospice and comfort care as the patient and family requested    B.  Laboratory examinations will continue to be deferred due to hospice care status    C.  The patient's prognosis is [id number].

## 2024-03-18 DIAGNOSIS — F41.9 ANXIETY: ICD-10-CM

## 2024-03-18 DIAGNOSIS — Z51.5 HOSPICE CARE PATIENT: Primary | ICD-10-CM

## 2024-03-18 RX ORDER — MORPHINE SULFATE 20 MG/ML
10 SOLUTION ORAL EVERY 2 HOUR PRN
Qty: 30 ML | Refills: 0 | Status: SHIPPED | OUTPATIENT
Start: 2024-03-18 | End: 2024-05-17

## 2024-03-18 RX ORDER — LORAZEPAM 0.5 MG/1
0.5 TABLET ORAL DAILY
Qty: 30 TABLET | Refills: 5 | Status: SHIPPED | OUTPATIENT
Start: 2024-03-18 | End: 2024-09-14

## 2024-04-16 ENCOUNTER — NURSING HOME VISIT (OUTPATIENT)
Dept: POST ACUTE CARE | Facility: EXTERNAL LOCATION | Age: 89
End: 2024-04-16
Payer: MEDICARE

## 2024-04-16 VITALS
RESPIRATION RATE: 16 BRPM | DIASTOLIC BLOOD PRESSURE: 80 MMHG | HEART RATE: 66 BPM | TEMPERATURE: 97.9 F | WEIGHT: 108 LBS | SYSTOLIC BLOOD PRESSURE: 142 MMHG | OXYGEN SATURATION: 97 %

## 2024-04-16 DIAGNOSIS — R54 FRAILTY SYNDROME IN GERIATRIC PATIENT: ICD-10-CM

## 2024-04-16 DIAGNOSIS — H91.93 BILATERAL DEAFNESS: ICD-10-CM

## 2024-04-16 DIAGNOSIS — G30.1 SEVERE LATE ONSET ALZHEIMER'S DEMENTIA WITHOUT BEHAVIORAL DISTURBANCE, PSYCHOTIC DISTURBANCE, MOOD DISTURBANCE, OR ANXIETY (MULTI): ICD-10-CM

## 2024-04-16 DIAGNOSIS — F02.C0 SEVERE LATE ONSET ALZHEIMER'S DEMENTIA WITHOUT BEHAVIORAL DISTURBANCE, PSYCHOTIC DISTURBANCE, MOOD DISTURBANCE, OR ANXIETY (MULTI): ICD-10-CM

## 2024-04-16 DIAGNOSIS — Z51.5 HOSPICE CARE PATIENT: Primary | ICD-10-CM

## 2024-04-16 DIAGNOSIS — H54.8 LEGAL BLINDNESS: ICD-10-CM

## 2024-04-16 PROCEDURE — 99309 SBSQ NF CARE MODERATE MDM 30: CPT | Performed by: NURSE PRACTITIONER

## 2024-04-16 NOTE — PROGRESS NOTES
Subjective   Patient ID: Denny Aguayo is a 89 yo male here for routine monthly visit.   HPI    He is legally  deaf and blind and does not communicate, he is frequently resistant to care.    He is eating and drinking well per staff with full assist.  No concerns per staff.   He remains under hospice care.  Sitting up in broda chair.       Review of Systems   Reason unable to perform ROS: Unable to communicate due to deafness and blindness.       Objective   /80   Pulse 66   Temp 36.6 °C (97.9 °F)   Resp 16   Wt 49 kg (108 lb)   SpO2 97%     Physical Exam  Vitals reviewed.   Constitutional:       General: He is not in acute distress.     Appearance: He is not ill-appearing.      Comments: Thin, Frail elderly sitting up in chair  in no apparent distress   Cardiovascular:      Rate and Rhythm: Normal rate and regular rhythm.      Pulses: Normal pulses.      Heart sounds:      No gallop.   Pulmonary:      Breath sounds: Normal breath sounds. No wheezing, rhonchi or rales.   Abdominal:      General: Abdomen is flat. Bowel sounds are normal.      Palpations: Abdomen is soft.      Tenderness: There is no guarding or rebound.   Musculoskeletal:      Right lower leg: No edema.      Left lower leg: No edema.         Assessment/Plan   Problem List Items Addressed This Visit             ICD-10-CM    Frailty syndrome in geriatric patient R54     At times becomes agitated with care.          Severe late onset Alzheimer's dementia without behavioral disturbance, psychotic disturbance, mood disturbance, or anxiety ([facility]) G30.1, F02.C0     Under hospice care.  He will open eyes to tactile stimulation but not consistently.   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          Bilateral deafness H91.93     He requires assistance with all adl's.          Hospice care patient - Primary Z51.5     He is under hospice care.  Symptoms appear well controlled.          meds/orders reviewed  staff to monitor and notify for any changes  Continue with hospice care  No further labs in keeping with hospice philosophy

## 2024-04-16 NOTE — ASSESSMENT & PLAN NOTE
Under hospice care.  He will open eyes to tactile stimulation but not consistently.   Appears comfortable at present

## 2024-04-16 NOTE — LETTER
Patient: Denny Aguayo  : 1930    Encounter Date: 2024    Subjective  Patient ID: Denny Aguayo is a 89 yo male here for routine monthly visit.   HPI    He is legally  deaf and blind and does not communicate, he is frequently resistant to care.    He is eating and drinking well per staff with full assist.  No concerns per staff.   He remains under hospice care.  Sitting up in broda chair.       Review of Systems   Reason unable to perform ROS: Unable to communicate due to deafness and blindness.       Objective  /80   Pulse 66   Temp 36.6 °C (97.9 °F)   Resp 16   Wt 49 kg (108 lb)   SpO2 97%     Physical Exam  Vitals reviewed.   Constitutional:       General: He is not in acute distress.     Appearance: He is not ill-appearing.      Comments: Thin, Frail elderly sitting up in chair  in no apparent distress   Cardiovascular:      Rate and Rhythm: Normal rate and regular rhythm.      Pulses: Normal pulses.      Heart sounds:      No gallop.   Pulmonary:      Breath sounds: Normal breath sounds. No wheezing, rhonchi or rales.   Abdominal:      General: Abdomen is flat. Bowel sounds are normal.      Palpations: Abdomen is soft.      Tenderness: There is no guarding or rebound.   Musculoskeletal:      Right lower leg: No edema.      Left lower leg: No edema.         Assessment/Plan  Problem List Items Addressed This Visit             ICD-10-CM    Frailty syndrome in geriatric patient R54     At times becomes agitated with care.          Severe late onset Alzheimer's dementia without behavioral disturbance, psychotic disturbance, mood disturbance, or anxiety (Multi) G30.1, F02.C0     Under hospice care.  He will open eyes to tactile stimulation but not consistently.   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          Bilateral deafness H91.93     He requires assistance with all adl's.          Hospice care patient - Primary Z51.5     He  is under hospice care.  Symptoms appear well controlled.         meds/orders reviewed  staff to monitor and notify for any changes  Continue with hospice care  No further labs in keeping with hospice philosophy      Electronically Signed By: SIDRA Randhawa   4/16/24 11:53 AM

## 2024-04-19 ENCOUNTER — NURSING HOME VISIT (OUTPATIENT)
Dept: POST ACUTE CARE | Facility: EXTERNAL LOCATION | Age: 89
End: 2024-04-19
Payer: MEDICARE

## 2024-04-19 VITALS
OXYGEN SATURATION: 97 % | SYSTOLIC BLOOD PRESSURE: 142 MMHG | TEMPERATURE: 97 F | RESPIRATION RATE: 16 BRPM | DIASTOLIC BLOOD PRESSURE: 80 MMHG | HEART RATE: 66 BPM | WEIGHT: 108 LBS

## 2024-04-19 DIAGNOSIS — Z51.5 HOSPICE CARE PATIENT: ICD-10-CM

## 2024-04-19 DIAGNOSIS — F02.C0 SEVERE LATE ONSET ALZHEIMER'S DEMENTIA WITHOUT BEHAVIORAL DISTURBANCE, PSYCHOTIC DISTURBANCE, MOOD DISTURBANCE, OR ANXIETY (MULTI): ICD-10-CM

## 2024-04-19 DIAGNOSIS — H54.8 LEGAL BLINDNESS: Primary | ICD-10-CM

## 2024-04-19 DIAGNOSIS — R54 FRAILTY SYNDROME IN GERIATRIC PATIENT: ICD-10-CM

## 2024-04-19 DIAGNOSIS — G30.1 SEVERE LATE ONSET ALZHEIMER'S DEMENTIA WITHOUT BEHAVIORAL DISTURBANCE, PSYCHOTIC DISTURBANCE, MOOD DISTURBANCE, OR ANXIETY (MULTI): ICD-10-CM

## 2024-04-19 DIAGNOSIS — H91.93 BILATERAL DEAFNESS: ICD-10-CM

## 2024-04-19 PROCEDURE — 99308 SBSQ NF CARE LOW MDM 20: CPT | Performed by: INTERNAL MEDICINE

## 2024-04-19 NOTE — LETTER
Patient: Denny Aguayo  : 1930    Encounter Date: 2024    Subjective  Patient ID: Denny Aguayo is a 89 y.o. male who is long term resident being seen and evaluated for multiple medical problems.    HPI   This 89-year-old male patient is resting comfortably in a Elo chair in no distress whatsoever.  He is nonverbal with eyes closed.  Nursing has no new adverse events reported to me.  Note is made of a 10 pound weight loss in the last month.    Current high risk medication:  Levsin  Ativan  Morphine  Compazine  Senna    Laboratory examinations have been deferred due to hospice care status    Review of Systems   Reason unable to perform ROS: Unable to communicate due to deafness and blindness.       Objective  /80   Pulse 66   Temp 36.1 °C (97 °F)   Resp 16   Wt 49 kg (108 lb)   SpO2 97%     Physical Exam  Vitals reviewed.   Constitutional:       General: He is not in acute distress.     Appearance: He is not ill-appearing.      Comments: Thin, Frail elderly sitting up in chair  in no apparent distress   Cardiovascular:      Rate and Rhythm: Normal rate and regular rhythm.      Pulses: Normal pulses.      Heart sounds:      No gallop.   Pulmonary:      Breath sounds: Normal breath sounds. No wheezing, rhonchi or rales.   Abdominal:      General: Abdomen is flat. Bowel sounds are normal.      Palpations: Abdomen is soft.      Tenderness: There is no guarding or rebound.   Musculoskeletal:      Right lower leg: No edema.      Left lower leg: No edema.   Neurological:      Comments: This patient is blind and deaf         Assessment/Plan  Problem List Items Addressed This Visit             ICD-10-CM    Frailty syndrome in geriatric patient R54    Severe late onset Alzheimer's dementia without behavioral disturbance, psychotic disturbance, mood disturbance, or anxiety (Multi) G30.1, F02.C0    Legal blindness - Primary H54.8    Bilateral deafness H91.93    Hospice care patient Z51.5     8.  We will  continue with hospice and comfort care as the patient and family request    B.  Laboratory examinations will continue to be deferred due to hospice care status    C.  The patient's prognosis is [id number].          Electronically Signed By: Bryant Peters MD   4/30/24  9:58 AM

## 2024-04-19 NOTE — PROGRESS NOTES
Subjective   Patient ID: Denny Aguayo is a 89 y.o. male who is long term resident being seen and evaluated for multiple medical problems.    HPI   This 89-year-old male patient is resting comfortably in a Elo chair in no distress whatsoever.  He is nonverbal with eyes closed.  Nursing has no new adverse events reported to me.  Note is made of a 10 pound weight loss in the last month.    Current high risk medication:  Levsin  Ativan  Morphine  Compazine  Senna    Laboratory examinations have been deferred due to hospice care status    Review of Systems   Reason unable to perform ROS: Unable to communicate due to deafness and blindness.       Objective   /80   Pulse 66   Temp 36.1 °C (97 °F)   Resp 16   Wt 49 kg (108 lb)   SpO2 97%     Physical Exam  Vitals reviewed.   Constitutional:       General: He is not in acute distress.     Appearance: He is not ill-appearing.      Comments: Thin, Frail elderly sitting up in chair  in no apparent distress   Cardiovascular:      Rate and Rhythm: Normal rate and regular rhythm.      Pulses: Normal pulses.      Heart sounds:      No gallop.   Pulmonary:      Breath sounds: Normal breath sounds. No wheezing, rhonchi or rales.   Abdominal:      General: Abdomen is flat. Bowel sounds are normal.      Palpations: Abdomen is soft.      Tenderness: There is no guarding or rebound.   Musculoskeletal:      Right lower leg: No edema.      Left lower leg: No edema.   Neurological:      Comments: This patient is blind and deaf         Assessment/Plan   Problem List Items Addressed This Visit             ICD-10-CM    Frailty syndrome in geriatric patient R54    Severe late onset Alzheimer's dementia without behavioral disturbance, psychotic disturbance, mood disturbance, or anxiety (Multi) G30.1, F02.C0    Legal blindness - Primary H54.8    Bilateral deafness H91.93    Hospice care patient Z51.5     8.  We will continue with hospice and comfort care as the patient and family  request    B.  Laboratory examinations will continue to be deferred due to hospice care status    C.  The patient's prognosis is [id number].

## 2024-05-16 ENCOUNTER — NURSING HOME VISIT (OUTPATIENT)
Dept: POST ACUTE CARE | Facility: EXTERNAL LOCATION | Age: 89
End: 2024-05-16
Payer: MEDICARE

## 2024-05-16 DIAGNOSIS — G30.1 SEVERE LATE ONSET ALZHEIMER'S DEMENTIA WITHOUT BEHAVIORAL DISTURBANCE, PSYCHOTIC DISTURBANCE, MOOD DISTURBANCE, OR ANXIETY (MULTI): Primary | ICD-10-CM

## 2024-05-16 DIAGNOSIS — H91.93 BILATERAL DEAFNESS: ICD-10-CM

## 2024-05-16 DIAGNOSIS — H54.8 LEGAL BLINDNESS: ICD-10-CM

## 2024-05-16 DIAGNOSIS — R54 FRAILTY SYNDROME IN GERIATRIC PATIENT: ICD-10-CM

## 2024-05-16 DIAGNOSIS — F02.C0 SEVERE LATE ONSET ALZHEIMER'S DEMENTIA WITHOUT BEHAVIORAL DISTURBANCE, PSYCHOTIC DISTURBANCE, MOOD DISTURBANCE, OR ANXIETY (MULTI): Primary | ICD-10-CM

## 2024-05-16 DIAGNOSIS — Z51.5 HOSPICE CARE PATIENT: ICD-10-CM

## 2024-05-16 PROCEDURE — 99308 SBSQ NF CARE LOW MDM 20: CPT | Performed by: INTERNAL MEDICINE

## 2024-05-16 NOTE — LETTER
Patient: Denny Aguayo  : 1930    Encounter Date: 2024    Subjective  Patient ID: Denny Aguayo is a 89 y.o. male who is long term resident being seen and evaluated for multiple medical problems.    HPI   This 89-year-old male patient is resting quietly in his bed in no distress.  He is nonverbal with eyes closed and with very little spontaneous movement.  Nursing has no new adverse events reported to me.    Current high risk medication:  Levsin  Ativan  Morphine  Compazine  Senna    Laboratory examinations have been deferred due to hospice care status    13.6 pound weight loss since 2023 as noted    Review of Systems   Reason unable to perform ROS: Unable to communicate due to deafness and blindness.       Objective  /74   Pulse 68   Temp 36.8 °C (98.2 °F)   Resp 16   Wt 49 kg (108 lb)   SpO2 97%     Physical Exam  Vitals reviewed.   Constitutional:       General: He is not in acute distress.     Appearance: He is not ill-appearing.      Comments: Thin, Frail elderly sitting up in chair  in no apparent distress   Cardiovascular:      Rate and Rhythm: Normal rate and regular rhythm.      Pulses: Normal pulses.      Heart sounds:      No gallop.   Pulmonary:      Breath sounds: Normal breath sounds. No wheezing, rhonchi or rales.   Abdominal:      General: Abdomen is flat. Bowel sounds are normal.      Palpations: Abdomen is soft.      Tenderness: There is no guarding or rebound.   Musculoskeletal:      Right lower leg: No edema.      Left lower leg: No edema.   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  We will continue with hospice and comfort care as the patient and family requested    B.  Laboratory examinations will continue to be deferred due to hospice care status    C.  The patient's prognosis is [id number].          Electronically Signed By: Bryant Peters MD   5/21/24 11:20 AM

## 2024-05-20 VITALS
HEART RATE: 68 BPM | TEMPERATURE: 98.2 F | WEIGHT: 108 LBS | RESPIRATION RATE: 16 BRPM | DIASTOLIC BLOOD PRESSURE: 74 MMHG | OXYGEN SATURATION: 97 % | SYSTOLIC BLOOD PRESSURE: 138 MMHG

## 2024-05-20 NOTE — PROGRESS NOTES
Subjective   Patient ID: Denny Aguayo is a 89 y.o. male who is long term resident being seen and evaluated for multiple medical problems.    HPI   This 89-year-old male patient is resting quietly in his bed in no distress.  He is nonverbal with eyes closed and with very little spontaneous movement.  Nursing has no new adverse events reported to me.    Current high risk medication:  Levsin  Ativan  Morphine  Compazine  Senna    Laboratory examinations have been deferred due to hospice care status    13.6 pound weight loss since December 17, 2023 as noted    Review of Systems   Reason unable to perform ROS: Unable to communicate due to deafness and blindness.       Objective   /74   Pulse 68   Temp 36.8 °C (98.2 °F)   Resp 16   Wt 49 kg (108 lb)   SpO2 97%     Physical Exam  Vitals reviewed.   Constitutional:       General: He is not in acute distress.     Appearance: He is not ill-appearing.      Comments: Thin, Frail elderly sitting up in chair  in no apparent distress   Cardiovascular:      Rate and Rhythm: Normal rate and regular rhythm.      Pulses: Normal pulses.      Heart sounds:      No gallop.   Pulmonary:      Breath sounds: Normal breath sounds. No wheezing, rhonchi or rales.   Abdominal:      General: Abdomen is flat. Bowel sounds are normal.      Palpations: Abdomen is soft.      Tenderness: There is no guarding or rebound.   Musculoskeletal:      Right lower leg: No edema.      Left lower leg: No edema.   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  We will continue with hospice and comfort care as  the patient and family requested    B.  Laboratory examinations will continue to be deferred due to hospice care status    C.  The patient's prognosis is [id number].

## 2024-06-21 ENCOUNTER — NURSING HOME VISIT (OUTPATIENT)
Dept: POST ACUTE CARE | Facility: EXTERNAL LOCATION | Age: 89
End: 2024-06-21
Payer: MEDICARE

## 2024-06-21 VITALS
HEART RATE: 73 BPM | DIASTOLIC BLOOD PRESSURE: 65 MMHG | TEMPERATURE: 97 F | RESPIRATION RATE: 16 BRPM | SYSTOLIC BLOOD PRESSURE: 133 MMHG | OXYGEN SATURATION: 97 % | WEIGHT: 105 LBS

## 2024-06-21 DIAGNOSIS — F02.C0 SEVERE LATE ONSET ALZHEIMER'S DEMENTIA WITHOUT BEHAVIORAL DISTURBANCE, PSYCHOTIC DISTURBANCE, MOOD DISTURBANCE, OR ANXIETY (MULTI): ICD-10-CM

## 2024-06-21 DIAGNOSIS — H91.93 BILATERAL DEAFNESS: ICD-10-CM

## 2024-06-21 DIAGNOSIS — H54.8 LEGAL BLINDNESS: ICD-10-CM

## 2024-06-21 DIAGNOSIS — G30.1 SEVERE LATE ONSET ALZHEIMER'S DEMENTIA WITHOUT BEHAVIORAL DISTURBANCE, PSYCHOTIC DISTURBANCE, MOOD DISTURBANCE, OR ANXIETY (MULTI): ICD-10-CM

## 2024-06-21 DIAGNOSIS — R54 FRAILTY SYNDROME IN GERIATRIC PATIENT: Primary | ICD-10-CM

## 2024-06-21 DIAGNOSIS — Z51.5 HOSPICE CARE PATIENT: ICD-10-CM

## 2024-06-21 DIAGNOSIS — R63.4 WEIGHT LOSS, UNINTENTIONAL: ICD-10-CM

## 2024-06-21 PROCEDURE — 99308 SBSQ NF CARE LOW MDM 20: CPT | Performed by: INTERNAL MEDICINE

## 2024-06-21 NOTE — PROGRESS NOTES
Subjective   Patient ID: Denny Aguayo is a 89 y.o. male who is long term resident being seen and evaluated for multiple medical problems.    HPI   This 89-year-old male patient is resting comfortably in his Elo chair in no distress.  He is quiet as per his usual.  Nursing has no new adverse events reported to me.  The patient himself is nonverbal    Current high risk medication:  Levsin  Ativan  Morphine  Compazine  Senna    Laboratory examinations have been deferred due to hospice care status    Note is made of 17 pound weight loss since December 17, 2023:    Review of Systems   Reason unable to perform ROS: Unable to communicate due to deafness and blindness.       Objective   /65   Pulse 73   Temp 36.1 °C (97 °F)   Resp 16   Wt 47.6 kg (105 lb)   SpO2 97%     Physical Exam  Vitals reviewed.   Constitutional:       General: He is not in acute distress.     Appearance: He is not ill-appearing.      Comments: Thin, Frail elderly sitting up in chair  in no apparent distress   Cardiovascular:      Rate and Rhythm: Normal rate and regular rhythm.      Pulses: Normal pulses.      Heart sounds:      No gallop.   Pulmonary:      Breath sounds: Normal breath sounds. No wheezing, rhonchi or rales.   Abdominal:      General: Abdomen is flat. Bowel sounds are normal.      Palpations: Abdomen is soft.      Tenderness: There is no guarding or rebound.   Musculoskeletal:      Right lower leg: No edema.      Left lower leg: No edema.   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  We will  continue with hospice and comfort care as the patient and family request    B.  Laboratory examinations will continue to be deferred due to hospice care status    C.  The patient's prognosis is [id number].

## 2024-06-21 NOTE — LETTER
Patient: Denny Aguayo  : 1930    Encounter Date: 2024    Subjective  Patient ID: Denny Aguayo is a 89 y.o. male who is long term resident being seen and evaluated for multiple medical problems.    HPI   This 89-year-old male patient is resting comfortably in his Elo chair in no distress.  He is quiet as per his usual.  Nursing has no new adverse events reported to me.  The patient himself is nonverbal    Current high risk medication:  Levsin  Ativan  Morphine  Compazine  Senna    Laboratory examinations have been deferred due to hospice care status    Note is made of 17 pound weight loss since 2023:    Review of Systems   Reason unable to perform ROS: Unable to communicate due to deafness and blindness.       Objective  /65   Pulse 73   Temp 36.1 °C (97 °F)   Resp 16   Wt 47.6 kg (105 lb)   SpO2 97%     Physical Exam  Vitals reviewed.   Constitutional:       General: He is not in acute distress.     Appearance: He is not ill-appearing.      Comments: Thin, Frail elderly sitting up in chair  in no apparent distress   Cardiovascular:      Rate and Rhythm: Normal rate and regular rhythm.      Pulses: Normal pulses.      Heart sounds:      No gallop.   Pulmonary:      Breath sounds: Normal breath sounds. No wheezing, rhonchi or rales.   Abdominal:      General: Abdomen is flat. Bowel sounds are normal.      Palpations: Abdomen is soft.      Tenderness: There is no guarding or rebound.   Musculoskeletal:      Right lower leg: No edema.      Left lower leg: No edema.   Neurological:      Comments: This patient is blind and deaf         Assessment/Plan  Problem List Items Addressed This Visit             ICD-10-CM    Frailty syndrome in geriatric patient - Primary R54    Severe late onset Alzheimer's dementia without behavioral disturbance, psychotic disturbance, mood disturbance, or anxiety (Multi) G30.1, F02.C0    Legal blindness H54.8    Bilateral deafness H91.93    Hospice care  patient Z51.5    Weight loss, unintentional R63.4     8.  We will continue with hospice and comfort care as the patient and family request    B.  Laboratory examinations will continue to be deferred due to hospice care status    C.  The patient's prognosis is [id number].        Electronically Signed By: Bryant Peters MD   6/25/24 10:53 AM

## 2024-06-25 ENCOUNTER — NURSING HOME VISIT (OUTPATIENT)
Dept: POST ACUTE CARE | Facility: EXTERNAL LOCATION | Age: 89
End: 2024-06-25
Payer: MEDICARE

## 2024-06-25 VITALS
WEIGHT: 104 LBS | OXYGEN SATURATION: 97 % | TEMPERATURE: 97.7 F | SYSTOLIC BLOOD PRESSURE: 133 MMHG | HEART RATE: 73 BPM | DIASTOLIC BLOOD PRESSURE: 65 MMHG | RESPIRATION RATE: 16 BRPM

## 2024-06-25 DIAGNOSIS — R54 FRAILTY SYNDROME IN GERIATRIC PATIENT: ICD-10-CM

## 2024-06-25 DIAGNOSIS — R63.4 WEIGHT LOSS, UNINTENTIONAL: ICD-10-CM

## 2024-06-25 DIAGNOSIS — F02.C0 SEVERE LATE ONSET ALZHEIMER'S DEMENTIA WITHOUT BEHAVIORAL DISTURBANCE, PSYCHOTIC DISTURBANCE, MOOD DISTURBANCE, OR ANXIETY (MULTI): ICD-10-CM

## 2024-06-25 DIAGNOSIS — G30.1 SEVERE LATE ONSET ALZHEIMER'S DEMENTIA WITHOUT BEHAVIORAL DISTURBANCE, PSYCHOTIC DISTURBANCE, MOOD DISTURBANCE, OR ANXIETY (MULTI): ICD-10-CM

## 2024-06-25 DIAGNOSIS — Z51.5 HOSPICE CARE PATIENT: Primary | ICD-10-CM

## 2024-06-25 PROCEDURE — 99309 SBSQ NF CARE MODERATE MDM 30: CPT | Performed by: NURSE PRACTITIONER

## 2024-06-25 NOTE — LETTER
Patient: Denny Aguayo  : 1930    Encounter Date: 2024    Subjective  Patient ID: Denny Aguayo is a 89 yo male here for routine monthly visit.   HPI    He is legally  deaf and blind and does not communicate, he is frequently resistant to care.    He is eating and drinking fair per staff with full assist and he continues to lose weight.   No concerns per staff.   He remains under hospice care.  Sitting up in broda chair.       Review of Systems   Reason unable to perform ROS: Unable to communicate due to deafness and blindness.       Objective  /65   Pulse 73   Temp 36.5 °C (97.7 °F)   Resp 16   Wt 47.2 kg (104 lb)   SpO2 97%     Physical Exam  Vitals reviewed.   Constitutional:       General: He is not in acute distress.     Appearance: He is not ill-appearing.      Comments: Thin, Frail elderly sitting up in chair  in no apparent distress   Cardiovascular:      Rate and Rhythm: Normal rate and regular rhythm.      Pulses: Normal pulses.      Heart sounds:      No gallop.   Pulmonary:      Breath sounds: Normal breath sounds. No wheezing, rhonchi or rales.   Abdominal:      General: Abdomen is flat. Bowel sounds are normal.      Palpations: Abdomen is soft.      Tenderness: There is no guarding or rebound.   Musculoskeletal:      Right lower leg: No edema.      Left lower leg: No edema.         Assessment/Plan  Problem List Items Addressed This Visit             ICD-10-CM    Frailty syndrome in geriatric patient R54     At times becomes agitated with care.          Severe late onset Alzheimer's dementia without behavioral disturbance, psychotic disturbance, mood disturbance, or anxiety (Multi) G30.1, F02.C0     Under hospice care.  He will open eyes to tactile stimulation but not consistently, did not open eyes today.   Appears comfortable at present         Hospice care patient - Primary Z51.5     He is under hospice care.  Symptoms appear well controlled.          Weight loss,  unintentional R63.4     Continues with slow steady weight loss, likely reflective of natural disease progression        meds/orders reviewed  staff to monitor and notify for any changes  Continue with hospice care  No further labs in keeping with hospice philosophy      Electronically Signed By: SIDRA Randhawa   6/25/24 12:19 PM

## 2024-06-25 NOTE — PROGRESS NOTES
Subjective   Patient ID: Denny Aguayo is a 89 yo male here for routine monthly visit.   HPI    He is legally  deaf and blind and does not communicate, he is frequently resistant to care.    He is eating and drinking fair per staff with full assist and he continues to lose weight.   No concerns per staff.   He remains under hospice care.  Sitting up in broda chair.       Review of Systems   Reason unable to perform ROS: Unable to communicate due to deafness and blindness.       Objective   /65   Pulse 73   Temp 36.5 °C (97.7 °F)   Resp 16   Wt 47.2 kg (104 lb)   SpO2 97%     Physical Exam  Vitals reviewed.   Constitutional:       General: He is not in acute distress.     Appearance: He is not ill-appearing.      Comments: Thin, Frail elderly sitting up in chair  in no apparent distress   Cardiovascular:      Rate and Rhythm: Normal rate and regular rhythm.      Pulses: Normal pulses.      Heart sounds:      No gallop.   Pulmonary:      Breath sounds: Normal breath sounds. No wheezing, rhonchi or rales.   Abdominal:      General: Abdomen is flat. Bowel sounds are normal.      Palpations: Abdomen is soft.      Tenderness: There is no guarding or rebound.   Musculoskeletal:      Right lower leg: No edema.      Left lower leg: No edema.         Assessment/Plan   Problem List Items Addressed This Visit             ICD-10-CM    Frailty syndrome in geriatric patient R54     At times becomes agitated with care.          Severe late onset Alzheimer's dementia without behavioral disturbance, psychotic disturbance, mood disturbance, or anxiety (Multi) G30.1, F02.C0     Under hospice care.  He will open eyes to tactile stimulation but not consistently, did not open eyes today.   Appears comfortable at present         Hospice care patient - Primary Z51.5     He is under hospice care.  Symptoms appear well controlled.          Weight loss, unintentional R63.4     Continues with slow steady weight loss, likely  reflective of natural disease progression        meds/orders reviewed  staff to monitor and notify for any changes  Continue with hospice care  No further labs in keeping with hospice philosophy

## 2024-06-25 NOTE — ASSESSMENT & PLAN NOTE
Under hospice care.  He will open eyes to tactile stimulation but not consistently, did not open eyes today.   Appears comfortable at present

## 2024-07-19 ENCOUNTER — NURSING HOME VISIT (OUTPATIENT)
Dept: POST ACUTE CARE | Facility: EXTERNAL LOCATION | Age: 89
End: 2024-07-19
Payer: MEDICARE

## 2024-07-19 DIAGNOSIS — F02.C0 SEVERE LATE ONSET ALZHEIMER'S DEMENTIA WITHOUT BEHAVIORAL DISTURBANCE, PSYCHOTIC DISTURBANCE, MOOD DISTURBANCE, OR ANXIETY (MULTI): ICD-10-CM

## 2024-07-19 DIAGNOSIS — G30.1 SEVERE LATE ONSET ALZHEIMER'S DEMENTIA WITHOUT BEHAVIORAL DISTURBANCE, PSYCHOTIC DISTURBANCE, MOOD DISTURBANCE, OR ANXIETY (MULTI): ICD-10-CM

## 2024-07-19 DIAGNOSIS — Z51.5 HOSPICE CARE PATIENT: ICD-10-CM

## 2024-07-19 DIAGNOSIS — R54 FRAILTY SYNDROME IN GERIATRIC PATIENT: Primary | ICD-10-CM

## 2024-07-19 DIAGNOSIS — H54.8 LEGAL BLINDNESS: ICD-10-CM

## 2024-07-19 DIAGNOSIS — H91.93 BILATERAL DEAFNESS: ICD-10-CM

## 2024-07-19 DIAGNOSIS — R63.4 WEIGHT LOSS, UNINTENTIONAL: ICD-10-CM

## 2024-07-19 PROCEDURE — 99308 SBSQ NF CARE LOW MDM 20: CPT | Performed by: INTERNAL MEDICINE

## 2024-07-19 NOTE — Clinical Note
Patient: Denny Aguayo  : 1930    Encounter Date: 2024    No notes on file    Electronically Signed By: Bryant Peters MD   24 11:39 AM

## 2024-07-20 VITALS
WEIGHT: 98 LBS | HEART RATE: 65 BPM | TEMPERATURE: 97.9 F | OXYGEN SATURATION: 95 % | RESPIRATION RATE: 18 BRPM | SYSTOLIC BLOOD PRESSURE: 136 MMHG | DIASTOLIC BLOOD PRESSURE: 74 MMHG

## 2024-07-20 NOTE — PROGRESS NOTES
Subjective   Patient ID: Denny Aguayo is a 89 y.o. male who is long term resident being seen and evaluated for multiple medical problems.    HPI   This 89-year-old male patient is resting comfortably in his bed in no distress.  He is curled up in a fetal position in his bed.  This patient is unfortunately blind and deaf.  He exhibits no outward sign of discomfort or shortness of breath.  Nursing has no new adverse events reported to me.    Current high risk medication:  Levsin  Ativan  Morphine  Compazine    There are no laboratory examinations on the chart for examination at this time.    Note is made of a 6.7 pound weight loss since June 9, 2024    Review of Systems   Reason unable to perform ROS: Unable to communicate due to deafness and blindness.       Objective   /74   Pulse 65   Temp 36.6 °C (97.9 °F)   Resp 18   Wt (!) 44.5 kg (98 lb)   SpO2 95%     Physical Exam  Vitals reviewed.   Constitutional:       General: He is not in acute distress.     Appearance: He is not ill-appearing.      Comments: Thin, Frail elderly sitting up in chair  in no apparent distress   Cardiovascular:      Rate and Rhythm: Normal rate and regular rhythm.      Pulses: Normal pulses.      Heart sounds:      No gallop.   Pulmonary:      Breath sounds: Normal breath sounds. No wheezing, rhonchi or rales.   Abdominal:      General: Abdomen is flat. Bowel sounds are normal.      Palpations: Abdomen is soft.      Tenderness: There is no guarding or rebound.   Musculoskeletal:      Right lower leg: No edema.      Left lower leg: No edema.         Assessment/Plan   Problem List Items Addressed This Visit             ICD-10-CM    Frailty syndrome in geriatric patient - Primary R54    Severe late onset Alzheimer's dementia without behavioral disturbance, psychotic disturbance, mood disturbance, or anxiety (Multi) G30.1, F02.C0    Legal blindness H54.8    Bilateral deafness H91.93    Hospice care patient Z51.5    Weight loss,  unintentional R63.4       8.  We will continue with hospice and comfort care as the patient and family requested    B.  Laboratory examinations will continue to be deferred due to hospice care status    C.  The patient's prognosis is [id number]

## 2024-07-30 NOTE — LETTER
Patient: Denny Aguayo  : 1930    Encounter Date: 10/26/2023    Subjective  Patient ID: Denny Aguayo is a 89 y.o. male who is long term resident being seen and evaluated for multiple medical problems.    HPI   This 89-year-old male patient is resting comfortably in his bed in no distress.  He has a history of blindness, deafness, Alzheimer's dementia, and history of distant alcohol use.  He has a patient on [facility].  He was previously residing at [facility].  He is admitted here to the extended care facility on hospice due to his declining state at the assisted living facility.  He is a [employer] .    Current medications:  Tylenol  DuoNeb  Levsin  Ativan  Morphine  Compazine    There are no current medications for review either on the chart or in epic    Review of Systems   Reason unable to perform ROS: Unable to communicate due to deafness and blindness.       Objective  /72   Pulse 88   Temp 36.6 °C (97.9 °F)   Resp 16   SpO2 95%     Physical Exam  Vitals reviewed.   Constitutional:       General: He is not in acute distress.     Appearance: He is not ill-appearing.      Comments: Frail elderly resting comfortably in bed in no distress   Cardiovascular:      Rate and Rhythm: Normal rate and regular rhythm.      Pulses: Normal pulses.      Heart sounds:      No gallop.   Pulmonary:      Breath sounds: Normal breath sounds. No wheezing, rhonchi or rales.   Abdominal:      General: Abdomen is flat. Bowel sounds are normal.      Palpations: Abdomen is soft.      Tenderness: There is no guarding or rebound.   Musculoskeletal:      Right lower leg: No edema.      Left lower leg: No edema.         Assessment/Plan  Problem List Items Addressed This Visit             ICD-10-CM    Frailty syndrome in geriatric patient - Primary R54    Severe late onset Alzheimer's dementia without behavioral disturbance, psychotic disturbance, mood disturbance, or anxiety (CMS/[facility])  G30.1, F02.C0    Legal blindness H54.8    Bilateral deafness H91.93    Other hyperlipidemia E78.49    Hospice care patient Z51.5     A.  We will continue with hospice and comfort care as the patient and family requested    B.  Laboratory examinations will be deferred due to hospice care status    C.  The patient's prognosis is [id number].        Electronically Signed By: Bryant Peters MD   10/31/23 10:08 AM   show

## 2024-08-16 ENCOUNTER — NURSING HOME VISIT (OUTPATIENT)
Dept: POST ACUTE CARE | Facility: EXTERNAL LOCATION | Age: 89
End: 2024-08-16
Payer: MEDICARE

## 2024-08-16 VITALS
OXYGEN SATURATION: 96 % | DIASTOLIC BLOOD PRESSURE: 80 MMHG | RESPIRATION RATE: 18 BRPM | SYSTOLIC BLOOD PRESSURE: 130 MMHG | TEMPERATURE: 98.1 F | WEIGHT: 102 LBS | HEART RATE: 86 BPM

## 2024-08-16 DIAGNOSIS — Z51.5 HOSPICE CARE PATIENT: ICD-10-CM

## 2024-08-16 DIAGNOSIS — R54 FRAILTY SYNDROME IN GERIATRIC PATIENT: Primary | ICD-10-CM

## 2024-08-16 DIAGNOSIS — G30.1 SEVERE LATE ONSET ALZHEIMER'S DEMENTIA WITHOUT BEHAVIORAL DISTURBANCE, PSYCHOTIC DISTURBANCE, MOOD DISTURBANCE, OR ANXIETY (MULTI): ICD-10-CM

## 2024-08-16 DIAGNOSIS — H54.8 LEGAL BLINDNESS: ICD-10-CM

## 2024-08-16 DIAGNOSIS — H91.93 BILATERAL DEAFNESS: ICD-10-CM

## 2024-08-16 DIAGNOSIS — F02.C0 SEVERE LATE ONSET ALZHEIMER'S DEMENTIA WITHOUT BEHAVIORAL DISTURBANCE, PSYCHOTIC DISTURBANCE, MOOD DISTURBANCE, OR ANXIETY (MULTI): ICD-10-CM

## 2024-08-16 PROCEDURE — 99308 SBSQ NF CARE LOW MDM 20: CPT | Performed by: INTERNAL MEDICINE

## 2024-08-16 NOTE — LETTER
Patient: Denny Aguayo  : 1930    Encounter Date: 2024    Subjective  Patient ID: Denny Aguayo is a 89 y.o. male who is long term resident being seen and evaluated for multiple medical problems.    HPI   89-year-old male patient is resting comfortably in his Elo chair in no distress whatsoever.  He is of course nonverbal and nursing has no new adverse events reported to me.    Current high risk medication:  Levsin  Ativan  Morphine    Laboratory examinations have been deferred due to hospice care status    Note is made of a 3.7 pound weight gain apparently since 2024    Review of Systems   Reason unable to perform ROS: Unable to communicate due to deafness and blindness.       Objective  /80   Pulse 86   Temp 36.7 °C (98.1 °F)   Resp 18   Wt 46.3 kg (102 lb)   SpO2 96%     Physical Exam  Vitals reviewed.   Constitutional:       General: He is not in acute distress.     Appearance: He is not ill-appearing.      Comments: Thin, Frail elderly sitting up in chair  in no apparent distress   Cardiovascular:      Rate and Rhythm: Normal rate and regular rhythm.      Pulses: Normal pulses.      Heart sounds:      No gallop.   Pulmonary:      Breath sounds: Normal breath sounds. No wheezing, rhonchi or rales.   Abdominal:      General: Abdomen is flat. Bowel sounds are normal.      Palpations: Abdomen is soft.      Tenderness: There is no guarding or rebound.   Musculoskeletal:      Right lower leg: No edema.      Left lower leg: No edema.         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  Will continue with hospice and comfort care as the patient and family  request    B.  Laboratory examinations will continue to be deferred due to hospice care status    C.  The patient's prognosis is guarded.      Electronically Signed By: Bryant Peters MD   8/26/24  3:23 PM

## 2024-08-16 NOTE — PROGRESS NOTES
Subjective   Patient ID: Denny Aguayo is a 89 y.o. male who is long term resident being seen and evaluated for multiple medical problems.    HPI   89-year-old male patient is resting comfortably in his Elo chair in no distress whatsoever.  He is of course nonverbal and nursing has no new adverse events reported to me.    Current high risk medication:  Levsin  Ativan  Morphine    Laboratory examinations have been deferred due to hospice care status    Note is made of a 3.7 pound weight gain apparently since July 8, 2024    Review of Systems   Reason unable to perform ROS: Unable to communicate due to deafness and blindness.       Objective   /80   Pulse 86   Temp 36.7 °C (98.1 °F)   Resp 18   Wt 46.3 kg (102 lb)   SpO2 96%     Physical Exam  Vitals reviewed.   Constitutional:       General: He is not in acute distress.     Appearance: He is not ill-appearing.      Comments: Thin, Frail elderly sitting up in chair  in no apparent distress   Cardiovascular:      Rate and Rhythm: Normal rate and regular rhythm.      Pulses: Normal pulses.      Heart sounds:      No gallop.   Pulmonary:      Breath sounds: Normal breath sounds. No wheezing, rhonchi or rales.   Abdominal:      General: Abdomen is flat. Bowel sounds are normal.      Palpations: Abdomen is soft.      Tenderness: There is no guarding or rebound.   Musculoskeletal:      Right lower leg: No edema.      Left lower leg: No edema.         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  Will continue with hospice and comfort care as the patient and family request    B.  Laboratory examinations will continue to be deferred due to  hospice care status    C.  The patient's prognosis is guarded.

## 2024-08-27 ENCOUNTER — NURSING HOME VISIT (OUTPATIENT)
Dept: POST ACUTE CARE | Facility: EXTERNAL LOCATION | Age: 89
End: 2024-08-27
Payer: MEDICARE

## 2024-08-27 VITALS
HEART RATE: 86 BPM | TEMPERATURE: 98.2 F | SYSTOLIC BLOOD PRESSURE: 130 MMHG | WEIGHT: 101 LBS | RESPIRATION RATE: 18 BRPM | DIASTOLIC BLOOD PRESSURE: 80 MMHG | OXYGEN SATURATION: 96 %

## 2024-08-27 DIAGNOSIS — H54.8 LEGAL BLINDNESS: ICD-10-CM

## 2024-08-27 DIAGNOSIS — Z51.5 HOSPICE CARE PATIENT: Primary | ICD-10-CM

## 2024-08-27 DIAGNOSIS — R63.4 WEIGHT LOSS, UNINTENTIONAL: ICD-10-CM

## 2024-08-27 DIAGNOSIS — G30.1 SEVERE LATE ONSET ALZHEIMER'S DEMENTIA WITHOUT BEHAVIORAL DISTURBANCE, PSYCHOTIC DISTURBANCE, MOOD DISTURBANCE, OR ANXIETY (MULTI): ICD-10-CM

## 2024-08-27 DIAGNOSIS — H91.93 BILATERAL DEAFNESS: ICD-10-CM

## 2024-08-27 DIAGNOSIS — R54 FRAILTY SYNDROME IN GERIATRIC PATIENT: ICD-10-CM

## 2024-08-27 DIAGNOSIS — F02.C0 SEVERE LATE ONSET ALZHEIMER'S DEMENTIA WITHOUT BEHAVIORAL DISTURBANCE, PSYCHOTIC DISTURBANCE, MOOD DISTURBANCE, OR ANXIETY (MULTI): ICD-10-CM

## 2024-08-27 PROCEDURE — 99309 SBSQ NF CARE MODERATE MDM 30: CPT | Performed by: NURSE PRACTITIONER

## 2024-08-27 NOTE — PROGRESS NOTES
Subjective   Patient ID: Denny Aguayo is a 89 yo male here for routine monthly visit.   HPI    He is legally  deaf and blind and does not communicate, he is frequently resistant to care.    He is eating and drinking fair per staff with full assist, weight has slowly declined overall, increased by 2 lbs this month.    No concerns per staff.   He remains under hospice care.  Sitting up in broda chair.   Hospice nurse present at time of visit.     Review of Systems   Reason unable to perform ROS: Unable to communicate due to deafness and blindness.       Objective   /80   Pulse 86   Temp 36.8 °C (98.2 °F)   Resp 18   Wt 45.8 kg (101 lb)   SpO2 96%     Physical Exam  Vitals reviewed.   Constitutional:       General: He is not in acute distress.     Appearance: He is not ill-appearing.      Comments: Thin, Frail elderly sitting up in chair  in no apparent distress   Cardiovascular:      Rate and Rhythm: Normal rate and regular rhythm.      Pulses: Normal pulses.      Heart sounds:      No gallop.   Pulmonary:      Breath sounds: Normal breath sounds. No wheezing, rhonchi or rales.   Abdominal:      General: Abdomen is flat. Bowel sounds are normal.      Palpations: Abdomen is soft.      Tenderness: There is no guarding or rebound.   Musculoskeletal:      Right lower leg: No edema.      Left lower leg: No edema.         Assessment/Plan   Problem List Items Addressed This Visit             ICD-10-CM    Frailty syndrome in geriatric patient R54     At times becomes agitated with care.          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          Bilateral deafness H91.93     He requires assistance with all adl's.          Hospice care patient - Primary Z51.5     He is under hospice care.  Symptoms appear well controlled.   Hospice nurse present at  bedside         Weight loss, unintentional R63.4     Weight has increased by  2 lbs this month but still overall a slow steady decline in weights.  This is likely reflective of natural disease progression        meds/orders reviewed  staff to monitor and notify for any changes  Continue with hospice care  No further labs in keeping with hospice philosophy

## 2024-08-27 NOTE — LETTER
Patient: Denny Aguayo  : 1930    Encounter Date: 2024    Subjective  Patient ID: Denny Aguayo is a 89 yo male here for routine monthly visit.   HPI    He is legally  deaf and blind and does not communicate, he is frequently resistant to care.    He is eating and drinking fair per staff with full assist, weight has slowly declined overall, increased by 2 lbs this month.    No concerns per staff.   He remains under hospice care.  Sitting up in broda chair.   Hospice nurse present at time of visit.     Review of Systems   Reason unable to perform ROS: Unable to communicate due to deafness and blindness.       Objective  /80   Pulse 86   Temp 36.8 °C (98.2 °F)   Resp 18   Wt 45.8 kg (101 lb)   SpO2 96%     Physical Exam  Vitals reviewed.   Constitutional:       General: He is not in acute distress.     Appearance: He is not ill-appearing.      Comments: Thin, Frail elderly sitting up in chair  in no apparent distress   Cardiovascular:      Rate and Rhythm: Normal rate and regular rhythm.      Pulses: Normal pulses.      Heart sounds:      No gallop.   Pulmonary:      Breath sounds: Normal breath sounds. No wheezing, rhonchi or rales.   Abdominal:      General: Abdomen is flat. Bowel sounds are normal.      Palpations: Abdomen is soft.      Tenderness: There is no guarding or rebound.   Musculoskeletal:      Right lower leg: No edema.      Left lower leg: No edema.         Assessment/Plan  Problem List Items Addressed This Visit             ICD-10-CM    Frailty syndrome in geriatric patient R54     At times becomes agitated with care.          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          Bilateral deafness H91.93     He requires assistance with all adl's.          Hospice care patient - Primary Z51.5     He is under hospice  care.  Symptoms appear well controlled.   Hospice nurse present at bedside         Weight loss, unintentional R63.4     Weight has increased by  2 lbs this month but still overall a slow steady decline in weights.  This is likely reflective of natural disease progression        meds/orders reviewed  staff to monitor and notify for any changes  Continue with hospice care  No further labs in keeping with hospice philosophy      Electronically Signed By: SIDRA Randhawa   8/27/24 11:40 AM

## 2024-08-27 NOTE — ASSESSMENT & PLAN NOTE
Weight has increased by  2 lbs this month but still overall a slow steady decline in weights.  This is likely reflective of natural disease progression

## 2024-10-17 ENCOUNTER — NURSING HOME VISIT (OUTPATIENT)
Dept: POST ACUTE CARE | Facility: EXTERNAL LOCATION | Age: 89
End: 2024-10-17
Payer: MEDICARE

## 2024-10-17 DIAGNOSIS — R63.4 WEIGHT LOSS, UNINTENTIONAL: ICD-10-CM

## 2024-10-17 DIAGNOSIS — H54.8 LEGAL BLINDNESS: ICD-10-CM

## 2024-10-17 DIAGNOSIS — R54 FRAILTY SYNDROME IN GERIATRIC PATIENT: ICD-10-CM

## 2024-10-17 DIAGNOSIS — Z51.5 HOSPICE CARE PATIENT: ICD-10-CM

## 2024-10-17 DIAGNOSIS — F02.C0 SEVERE LATE ONSET ALZHEIMER'S DEMENTIA WITHOUT BEHAVIORAL DISTURBANCE, PSYCHOTIC DISTURBANCE, MOOD DISTURBANCE, OR ANXIETY (MULTI): Primary | ICD-10-CM

## 2024-10-17 DIAGNOSIS — G30.1 SEVERE LATE ONSET ALZHEIMER'S DEMENTIA WITHOUT BEHAVIORAL DISTURBANCE, PSYCHOTIC DISTURBANCE, MOOD DISTURBANCE, OR ANXIETY (MULTI): Primary | ICD-10-CM

## 2024-10-17 DIAGNOSIS — H91.93 BILATERAL DEAFNESS: ICD-10-CM

## 2024-10-17 PROCEDURE — 99308 SBSQ NF CARE LOW MDM 20: CPT | Performed by: INTERNAL MEDICINE

## 2024-10-21 VITALS
WEIGHT: 98 LBS | HEART RATE: 86 BPM | SYSTOLIC BLOOD PRESSURE: 128 MMHG | DIASTOLIC BLOOD PRESSURE: 70 MMHG | RESPIRATION RATE: 18 BRPM | OXYGEN SATURATION: 97 %

## 2024-10-29 ENCOUNTER — NURSING HOME VISIT (OUTPATIENT)
Dept: POST ACUTE CARE | Facility: EXTERNAL LOCATION | Age: 89
End: 2024-10-29
Payer: MEDICARE

## 2024-10-29 VITALS
WEIGHT: 98 LBS | TEMPERATURE: 97.8 F | HEART RATE: 86 BPM | RESPIRATION RATE: 18 BRPM | OXYGEN SATURATION: 97 % | SYSTOLIC BLOOD PRESSURE: 128 MMHG | DIASTOLIC BLOOD PRESSURE: 70 MMHG

## 2024-10-29 DIAGNOSIS — F02.C0 SEVERE LATE ONSET ALZHEIMER'S DEMENTIA WITHOUT BEHAVIORAL DISTURBANCE, PSYCHOTIC DISTURBANCE, MOOD DISTURBANCE, OR ANXIETY (MULTI): Primary | ICD-10-CM

## 2024-10-29 DIAGNOSIS — G30.1 SEVERE LATE ONSET ALZHEIMER'S DEMENTIA WITHOUT BEHAVIORAL DISTURBANCE, PSYCHOTIC DISTURBANCE, MOOD DISTURBANCE, OR ANXIETY (MULTI): Primary | ICD-10-CM

## 2024-10-29 DIAGNOSIS — R63.4 WEIGHT LOSS, UNINTENTIONAL: ICD-10-CM

## 2024-10-29 DIAGNOSIS — Z51.5 HOSPICE CARE PATIENT: ICD-10-CM

## 2024-10-29 DIAGNOSIS — H91.93 BILATERAL DEAFNESS: ICD-10-CM

## 2024-10-29 DIAGNOSIS — H54.8 LEGAL BLINDNESS: ICD-10-CM

## 2024-10-29 PROCEDURE — 99309 SBSQ NF CARE MODERATE MDM 30: CPT | Performed by: NURSE PRACTITIONER

## 2024-11-15 ENCOUNTER — NURSING HOME VISIT (OUTPATIENT)
Dept: POST ACUTE CARE | Facility: EXTERNAL LOCATION | Age: 89
End: 2024-11-15
Payer: MEDICARE

## 2024-11-15 VITALS
RESPIRATION RATE: 18 BRPM | DIASTOLIC BLOOD PRESSURE: 74 MMHG | OXYGEN SATURATION: 97 % | WEIGHT: 99 LBS | SYSTOLIC BLOOD PRESSURE: 132 MMHG | HEART RATE: 78 BPM

## 2024-11-15 DIAGNOSIS — F02.C0 SEVERE LATE ONSET ALZHEIMER'S DEMENTIA WITHOUT BEHAVIORAL DISTURBANCE, PSYCHOTIC DISTURBANCE, MOOD DISTURBANCE, OR ANXIETY (MULTI): ICD-10-CM

## 2024-11-15 DIAGNOSIS — R63.4 WEIGHT LOSS, UNINTENTIONAL: Primary | ICD-10-CM

## 2024-11-15 DIAGNOSIS — H91.93 BILATERAL DEAFNESS: ICD-10-CM

## 2024-11-15 DIAGNOSIS — Z51.5 HOSPICE CARE PATIENT: ICD-10-CM

## 2024-11-15 DIAGNOSIS — G30.1 SEVERE LATE ONSET ALZHEIMER'S DEMENTIA WITHOUT BEHAVIORAL DISTURBANCE, PSYCHOTIC DISTURBANCE, MOOD DISTURBANCE, OR ANXIETY (MULTI): ICD-10-CM

## 2024-11-15 DIAGNOSIS — R54 FRAILTY SYNDROME IN GERIATRIC PATIENT: ICD-10-CM

## 2024-11-15 DIAGNOSIS — H54.8 LEGAL BLINDNESS: ICD-10-CM

## 2024-11-15 PROCEDURE — 99308 SBSQ NF CARE LOW MDM 20: CPT | Performed by: INTERNAL MEDICINE

## 2024-11-15 NOTE — LETTER
Patient: Denny Aguayo  : 1930    Encounter Date: 11/15/2024    Subjective  Patient ID: Denny Aguayo is a 89 y.o. male who is long term resident being seen and evaluated for multiple medical problems.    HPI   This 89-year-old male patient is resting comfortably in his Elo chair in no distress whatsoever.  He is nonverbal per usual and nursing has no reported adverse events to me.    Current high risk medication:  Left send  Lorazepam  Morphine sulfate    Laboratory examinations have been deferred due to hospice care status    Review of Systems   Reason unable to perform ROS: Unable to communicate due to deafness and blindness.       Objective  /74   Pulse 78   Resp 18   Wt (!) 44.9 kg (99 lb)   SpO2 97%     Physical Exam  Vitals reviewed.   Constitutional:       General: He is not in acute distress.     Appearance: He is not ill-appearing.      Comments: Thin, Frail elderly sitting up in chair  in no apparent distress   Cardiovascular:      Rate and Rhythm: Normal rate and regular rhythm.      Pulses: Normal pulses.      Heart sounds:      No gallop.   Pulmonary:      Breath sounds: Normal breath sounds. No wheezing, rhonchi or rales.   Abdominal:      General: Abdomen is flat. Bowel sounds are normal.      Palpations: Abdomen is soft.      Tenderness: There is no guarding or rebound.   Musculoskeletal:      Right lower leg: No edema.      Left lower leg: No edema.         Assessment/Plan  Problem List Items Addressed This Visit             ICD-10-CM    Frailty syndrome in geriatric patient R54    Severe late onset Alzheimer's dementia without behavioral disturbance, psychotic disturbance, mood disturbance, or anxiety (Multi) G30.1, F02.C0    Legal blindness H54.8    Bilateral deafness H91.93    Hospice care patient Z51.5    Weight loss, unintentional - Primary R63.4       8.  We will continue with hospice and comfort care as the patient and family requested    B.  Laboratory examinations will  continue to be deferred to hospice care status    C.  The patient's prognosis [id number].      Electronically Signed By: Bryant Peters MD   11/20/24  5:01 PM

## 2024-11-15 NOTE — PROGRESS NOTES
Subjective   Patient ID: Denny Aguayo is a 89 y.o. male who is long term resident being seen and evaluated for multiple medical problems.    HPI   This 89-year-old male patient is resting comfortably in his Elo chair in no distress whatsoever.  He is nonverbal per usual and nursing has no reported adverse events to me.    Current high risk medication:  Left send  Lorazepam  Morphine sulfate    Laboratory examinations have been deferred due to hospice care status    Review of Systems   Reason unable to perform ROS: Unable to communicate due to deafness and blindness.       Objective   /74   Pulse 78   Resp 18   Wt (!) 44.9 kg (99 lb)   SpO2 97%     Physical Exam  Vitals reviewed.   Constitutional:       General: He is not in acute distress.     Appearance: He is not ill-appearing.      Comments: Thin, Frail elderly sitting up in chair  in no apparent distress   Cardiovascular:      Rate and Rhythm: Normal rate and regular rhythm.      Pulses: Normal pulses.      Heart sounds:      No gallop.   Pulmonary:      Breath sounds: Normal breath sounds. No wheezing, rhonchi or rales.   Abdominal:      General: Abdomen is flat. Bowel sounds are normal.      Palpations: Abdomen is soft.      Tenderness: There is no guarding or rebound.   Musculoskeletal:      Right lower leg: No edema.      Left lower leg: No edema.         Assessment/Plan   Problem List Items Addressed This Visit             ICD-10-CM    Frailty syndrome in geriatric patient R54    Severe late onset Alzheimer's dementia without behavioral disturbance, psychotic disturbance, mood disturbance, or anxiety (Multi) G30.1, F02.C0    Legal blindness H54.8    Bilateral deafness H91.93    Hospice care patient Z51.5    Weight loss, unintentional - Primary R63.4       8.  We will continue with hospice and comfort care as the patient and family requested    B.  Laboratory examinations will continue to be deferred to hospice care status    C.  The patient's  prognosis [id number].